# Patient Record
Sex: FEMALE | URBAN - NONMETROPOLITAN AREA
[De-identification: names, ages, dates, MRNs, and addresses within clinical notes are randomized per-mention and may not be internally consistent; named-entity substitution may affect disease eponyms.]

---

## 2017-02-24 ENCOUNTER — TELEPHONE (OUTPATIENT)
Dept: NEUROLOGY | Age: 62
End: 2017-02-24

## 2021-05-21 RX ORDER — MIRTAZAPINE 30 MG/1
30 TABLET, FILM COATED ORAL NIGHTLY
COMMUNITY

## 2021-05-21 RX ORDER — LEVOTHYROXINE SODIUM 0.05 MG/1
50 TABLET ORAL DAILY
COMMUNITY

## 2021-05-21 RX ORDER — OMEPRAZOLE 20 MG/1
20 CAPSULE, DELAYED RELEASE ORAL DAILY
COMMUNITY

## 2021-08-05 ENCOUNTER — TELEPHONE (OUTPATIENT)
Dept: CARDIOLOGY | Facility: CLINIC | Age: 66
End: 2021-08-05

## 2021-08-05 NOTE — TELEPHONE ENCOUNTER
Patient was asleep, so I informed her daughter that she really needs to keep her appointment in September. She stated her mother wasn't feeling well. I told her to try to keep the upcoming September appointment, that Dr Sierra had referred her to us several months ago for stent placement. She has rescheduled five appointments since May. Patient's daughter voiced understanding.Jesenia Childress MA

## 2024-01-19 ENCOUNTER — APPOINTMENT (OUTPATIENT)
Dept: CT IMAGING | Age: 69
End: 2024-01-19
Payer: MEDICARE

## 2024-01-19 ENCOUNTER — HOSPITAL ENCOUNTER (INPATIENT)
Age: 69
LOS: 7 days | Discharge: HOSPICE | End: 2024-01-26
Attending: EMERGENCY MEDICINE | Admitting: STUDENT IN AN ORGANIZED HEALTH CARE EDUCATION/TRAINING PROGRAM
Payer: MEDICARE

## 2024-01-19 ENCOUNTER — APPOINTMENT (OUTPATIENT)
Dept: GENERAL RADIOLOGY | Age: 69
End: 2024-01-19
Payer: MEDICARE

## 2024-01-19 DIAGNOSIS — Z95.1 HX OF CABG: ICD-10-CM

## 2024-01-19 DIAGNOSIS — J96.01 ACUTE RESPIRATORY FAILURE WITH HYPOXIA (HCC): ICD-10-CM

## 2024-01-19 DIAGNOSIS — I65.23 CAROTID OCCLUSION, BILATERAL: ICD-10-CM

## 2024-01-19 DIAGNOSIS — I63.511 ACUTE ISCHEMIC RIGHT MCA STROKE (HCC): Primary | ICD-10-CM

## 2024-01-19 PROBLEM — R79.89 ELEVATED TROPONIN: Status: ACTIVE | Noted: 2024-01-19

## 2024-01-19 PROBLEM — M62.82 NON-TRAUMATIC RHABDOMYOLYSIS: Status: ACTIVE | Noted: 2024-01-19

## 2024-01-19 PROBLEM — I63.9 CEREBROVASCULAR ACCIDENT (CVA) DETERMINED BY CLINICAL ASSESSMENT (HCC): Status: ACTIVE | Noted: 2024-01-19

## 2024-01-19 LAB
ALBUMIN SERPL-MCNC: 4.3 G/DL (ref 3.5–5.2)
ALLENS TEST: ABNORMAL
ALP SERPL-CCNC: 165 U/L (ref 35–104)
ALT SERPL-CCNC: 11 U/L (ref 5–33)
AMPHET UR QL SCN: NEGATIVE
ANION GAP SERPL CALCULATED.3IONS-SCNC: 16 MMOL/L (ref 7–19)
APTT PPP: 27.3 SEC (ref 26–36.2)
AST SERPL-CCNC: 44 U/L (ref 5–32)
BACTERIA URNS QL MICRO: NEGATIVE /HPF
BARBITURATES UR QL SCN: NEGATIVE
BASE EXCESS ARTERIAL: -0.2 MMOL/L (ref -2–2)
BASOPHILS # BLD: 0 K/UL (ref 0–0.2)
BASOPHILS NFR BLD: 0.1 % (ref 0–1)
BENZODIAZ UR QL SCN: NEGATIVE
BILIRUB SERPL-MCNC: 0.7 MG/DL (ref 0.2–1.2)
BILIRUB UR QL STRIP: NEGATIVE
BUN SERPL-MCNC: 20 MG/DL (ref 8–23)
BUPRENORPHINE URINE: NEGATIVE
CALCIUM SERPL-MCNC: 10.3 MG/DL (ref 8.8–10.2)
CANNABINOIDS UR QL SCN: NEGATIVE
CARBOXYHEMOGLOBIN ARTERIAL: 1.2 % (ref 0–5)
CHLORIDE SERPL-SCNC: 100 MMOL/L (ref 98–111)
CK SERPL-CCNC: 1131 U/L (ref 26–192)
CLARITY UR: CLEAR
CO2 SERPL-SCNC: 23 MMOL/L (ref 22–29)
COCAINE UR QL SCN: NEGATIVE
COLOR UR: ABNORMAL
CREAT SERPL-MCNC: 0.7 MG/DL (ref 0.5–0.9)
CRYSTALS URNS MICRO: ABNORMAL /HPF
DRUG SCREEN COMMENT UR-IMP: ABNORMAL
EOSINOPHIL # BLD: 0 K/UL (ref 0–0.6)
EOSINOPHIL NFR BLD: 0 % (ref 0–5)
EPI CELLS #/AREA URNS AUTO: 2 /HPF (ref 0–5)
ERYTHROCYTE [DISTWIDTH] IN BLOOD BY AUTOMATED COUNT: 15.5 % (ref 11.5–14.5)
ETHANOLAMINE SERPL-MCNC: <10 MG/DL (ref 0–0.08)
FENTANYL SCREEN, URINE: NEGATIVE
FIO2: 21 %
GLUCOSE BLD-MCNC: 132 MG/DL (ref 70–99)
GLUCOSE BLD-MCNC: 175 MG/DL (ref 70–99)
GLUCOSE BLD-MCNC: 194 MG/DL (ref 70–99)
GLUCOSE SERPL-MCNC: 235 MG/DL (ref 74–109)
GLUCOSE UR STRIP.AUTO-MCNC: NEGATIVE MG/DL
HCO3 ARTERIAL: 24 MMOL/L (ref 22–26)
HCT VFR BLD AUTO: 45.9 % (ref 37–47)
HEMOGLOBIN, ART, EXTENDED: 13.5 G/DL (ref 12–16)
HGB BLD-MCNC: 14.5 G/DL (ref 12–16)
HGB UR STRIP.AUTO-MCNC: NEGATIVE MG/L
HYALINE CASTS #/AREA URNS AUTO: 18 /HPF (ref 0–8)
IMM GRANULOCYTES # BLD: 0.1 K/UL
INR PPP: 1.2 (ref 0.88–1.18)
KETONES UR STRIP.AUTO-MCNC: NEGATIVE MG/DL
LEUKOCYTE ESTERASE UR QL STRIP.AUTO: NEGATIVE
LYMPHOCYTES # BLD: 0.4 K/UL (ref 1.1–4.5)
LYMPHOCYTES NFR BLD: 2.4 % (ref 20–40)
MCH RBC QN AUTO: 27.9 PG (ref 27–31)
MCHC RBC AUTO-ENTMCNC: 31.6 G/DL (ref 33–37)
MCV RBC AUTO: 88.3 FL (ref 81–99)
METHADONE UR QL SCN: NEGATIVE
METHAMPHETAMINE, URINE: NEGATIVE
METHEMOGLOBIN ARTERIAL: 0 %
MODE: ABNORMAL
MONOCYTES # BLD: 0.6 K/UL (ref 0–0.9)
MONOCYTES NFR BLD: 4.2 % (ref 0–10)
NEUTROPHILS # BLD: 13.8 K/UL (ref 1.5–7.5)
NEUTS SEG NFR BLD: 92.7 % (ref 50–65)
NITRITE UR QL STRIP.AUTO: NEGATIVE
O2 CONTENT ARTERIAL: 14.6 ML/DL
O2 SAT, ARTERIAL: 77.2 %
O2 THERAPY: ABNORMAL
OPIATES UR QL SCN: POSITIVE
OXYCODONE UR QL SCN: NEGATIVE
PCO2 ARTERIAL: 37 MMHG (ref 35–45)
PCP UR QL SCN: NEGATIVE
PERFORMED ON: ABNORMAL
PH ARTERIAL: 7.42 (ref 7.35–7.45)
PH UR STRIP.AUTO: 5.5 [PH] (ref 5–8)
PLATELET # BLD AUTO: 295 K/UL (ref 130–400)
PMV BLD AUTO: 9.9 FL (ref 9.4–12.3)
PO2 ARTERIAL: 41 MMHG (ref 80–100)
POTASSIUM BLD-SCNC: 3.3 MMOL/L
POTASSIUM SERPL-SCNC: 4.3 MMOL/L (ref 3.5–5)
PROT SERPL-MCNC: 7.6 G/DL (ref 6.6–8.7)
PROT UR STRIP.AUTO-MCNC: 100 MG/DL
PROTHROMBIN TIME: 14.8 SEC (ref 12–14.6)
RBC # BLD AUTO: 5.2 M/UL (ref 4.2–5.4)
RBC #/AREA URNS AUTO: 1 /HPF (ref 0–4)
SAMPLE SOURCE: ABNORMAL
SODIUM SERPL-SCNC: 139 MMOL/L (ref 136–145)
SP GR UR STRIP.AUTO: 1.04 (ref 1–1.03)
SPONT RATE(BPM): 19
T4 FREE SERPL-MCNC: 0.64 NG/DL (ref 0.93–1.7)
TRICYCLIC, URINE: NEGATIVE
TROPONIN, HIGH SENSITIVITY: 223 NG/L (ref 0–14)
TROPONIN, HIGH SENSITIVITY: 244 NG/L (ref 0–14)
TSH SERPL DL<=0.005 MIU/L-ACNC: 29.31 UIU/ML (ref 0.35–5.5)
UROBILINOGEN UR STRIP.AUTO-MCNC: 0.2 E.U./DL
WBC # BLD AUTO: 14.9 K/UL (ref 4.8–10.8)
WBC #/AREA URNS AUTO: 2 /HPF (ref 0–5)

## 2024-01-19 PROCEDURE — 85730 THROMBOPLASTIN TIME PARTIAL: CPT

## 2024-01-19 PROCEDURE — 93970 EXTREMITY STUDY: CPT | Performed by: SURGERY

## 2024-01-19 PROCEDURE — 51702 INSERT TEMP BLADDER CATH: CPT

## 2024-01-19 PROCEDURE — 70498 CT ANGIOGRAPHY NECK: CPT

## 2024-01-19 PROCEDURE — 84439 ASSAY OF FREE THYROXINE: CPT

## 2024-01-19 PROCEDURE — 84484 ASSAY OF TROPONIN QUANT: CPT

## 2024-01-19 PROCEDURE — 1210000000 HC MED SURG R&B

## 2024-01-19 PROCEDURE — 84443 ASSAY THYROID STIM HORMONE: CPT

## 2024-01-19 PROCEDURE — 6360000004 HC RX CONTRAST MEDICATION: Performed by: EMERGENCY MEDICINE

## 2024-01-19 PROCEDURE — 70450 CT HEAD/BRAIN W/O DYE: CPT

## 2024-01-19 PROCEDURE — 85610 PROTHROMBIN TIME: CPT

## 2024-01-19 PROCEDURE — 99285 EMERGENCY DEPT VISIT HI MDM: CPT

## 2024-01-19 PROCEDURE — 2580000003 HC RX 258: Performed by: NURSE PRACTITIONER

## 2024-01-19 PROCEDURE — 36600 WITHDRAWAL OF ARTERIAL BLOOD: CPT

## 2024-01-19 PROCEDURE — 94760 N-INVAS EAR/PLS OXIMETRY 1: CPT

## 2024-01-19 PROCEDURE — 36415 COLL VENOUS BLD VENIPUNCTURE: CPT

## 2024-01-19 PROCEDURE — G0480 DRUG TEST DEF 1-7 CLASSES: HCPCS

## 2024-01-19 PROCEDURE — 82962 GLUCOSE BLOOD TEST: CPT

## 2024-01-19 PROCEDURE — 82550 ASSAY OF CK (CPK): CPT

## 2024-01-19 PROCEDURE — 82077 ASSAY SPEC XCP UR&BREATH IA: CPT

## 2024-01-19 PROCEDURE — 2580000003 HC RX 258: Performed by: EMERGENCY MEDICINE

## 2024-01-19 PROCEDURE — 2700000000 HC OXYGEN THERAPY PER DAY

## 2024-01-19 PROCEDURE — 80053 COMPREHEN METABOLIC PANEL: CPT

## 2024-01-19 PROCEDURE — 82803 BLOOD GASES ANY COMBINATION: CPT

## 2024-01-19 PROCEDURE — 80307 DRUG TEST PRSMV CHEM ANLYZR: CPT

## 2024-01-19 PROCEDURE — 71045 X-RAY EXAM CHEST 1 VIEW: CPT

## 2024-01-19 PROCEDURE — 85025 COMPLETE CBC W/AUTO DIFF WBC: CPT

## 2024-01-19 PROCEDURE — 81001 URINALYSIS AUTO W/SCOPE: CPT

## 2024-01-19 RX ORDER — ASPIRIN 300 MG/1
300 SUPPOSITORY RECTAL DAILY
Status: DISCONTINUED | OUTPATIENT
Start: 2024-01-20 | End: 2024-01-23

## 2024-01-19 RX ORDER — SODIUM CHLORIDE 9 MG/ML
INJECTION, SOLUTION INTRAVENOUS CONTINUOUS
Status: DISCONTINUED | OUTPATIENT
Start: 2024-01-19 | End: 2024-01-20

## 2024-01-19 RX ORDER — ONDANSETRON 4 MG/1
4 TABLET, ORALLY DISINTEGRATING ORAL EVERY 8 HOURS PRN
Status: DISCONTINUED | OUTPATIENT
Start: 2024-01-19 | End: 2024-01-26 | Stop reason: HOSPADM

## 2024-01-19 RX ORDER — ATORVASTATIN CALCIUM 80 MG/1
80 TABLET, FILM COATED ORAL NIGHTLY
Status: DISCONTINUED | OUTPATIENT
Start: 2024-01-19 | End: 2024-01-23

## 2024-01-19 RX ORDER — ONDANSETRON 2 MG/ML
4 INJECTION INTRAMUSCULAR; INTRAVENOUS EVERY 6 HOURS PRN
Status: DISCONTINUED | OUTPATIENT
Start: 2024-01-19 | End: 2024-01-26 | Stop reason: HOSPADM

## 2024-01-19 RX ORDER — INSULIN LISPRO 100 [IU]/ML
0-4 INJECTION, SOLUTION INTRAVENOUS; SUBCUTANEOUS NIGHTLY
Status: DISCONTINUED | OUTPATIENT
Start: 2024-01-19 | End: 2024-01-19

## 2024-01-19 RX ORDER — INSULIN LISPRO 100 [IU]/ML
0-4 INJECTION, SOLUTION INTRAVENOUS; SUBCUTANEOUS
Status: DISCONTINUED | OUTPATIENT
Start: 2024-01-19 | End: 2024-01-19

## 2024-01-19 RX ORDER — 0.9 % SODIUM CHLORIDE 0.9 %
500 INTRAVENOUS SOLUTION INTRAVENOUS ONCE
Status: COMPLETED | OUTPATIENT
Start: 2024-01-19 | End: 2024-01-19

## 2024-01-19 RX ORDER — ENOXAPARIN SODIUM 100 MG/ML
30 INJECTION SUBCUTANEOUS DAILY
Status: DISCONTINUED | OUTPATIENT
Start: 2024-01-20 | End: 2024-01-20

## 2024-01-19 RX ORDER — SODIUM CHLORIDE 0.9 % (FLUSH) 0.9 %
5-40 SYRINGE (ML) INJECTION EVERY 12 HOURS SCHEDULED
Status: DISCONTINUED | OUTPATIENT
Start: 2024-01-19 | End: 2024-01-26 | Stop reason: HOSPADM

## 2024-01-19 RX ORDER — ASPIRIN 81 MG/1
81 TABLET, CHEWABLE ORAL DAILY
Status: DISCONTINUED | OUTPATIENT
Start: 2024-01-20 | End: 2024-01-23

## 2024-01-19 RX ORDER — SODIUM CHLORIDE 9 MG/ML
INJECTION, SOLUTION INTRAVENOUS PRN
Status: DISCONTINUED | OUTPATIENT
Start: 2024-01-19 | End: 2024-01-26 | Stop reason: HOSPADM

## 2024-01-19 RX ORDER — DEXTROSE MONOHYDRATE 100 MG/ML
INJECTION, SOLUTION INTRAVENOUS CONTINUOUS PRN
Status: DISCONTINUED | OUTPATIENT
Start: 2024-01-19 | End: 2024-01-26 | Stop reason: HOSPADM

## 2024-01-19 RX ORDER — SODIUM CHLORIDE 0.9 % (FLUSH) 0.9 %
5-40 SYRINGE (ML) INJECTION PRN
Status: DISCONTINUED | OUTPATIENT
Start: 2024-01-19 | End: 2024-01-26 | Stop reason: HOSPADM

## 2024-01-19 RX ORDER — POLYETHYLENE GLYCOL 3350 17 G/17G
17 POWDER, FOR SOLUTION ORAL DAILY PRN
Status: DISCONTINUED | OUTPATIENT
Start: 2024-01-19 | End: 2024-01-26 | Stop reason: HOSPADM

## 2024-01-19 RX ADMIN — SODIUM CHLORIDE: 9 INJECTION, SOLUTION INTRAVENOUS at 21:14

## 2024-01-19 RX ADMIN — SODIUM CHLORIDE 500 ML: 9 INJECTION, SOLUTION INTRAVENOUS at 18:41

## 2024-01-19 RX ADMIN — IOPAMIDOL 75 ML: 755 INJECTION, SOLUTION INTRAVENOUS at 17:34

## 2024-01-19 NOTE — ED PROVIDER NOTES
[facility] EMERGENCY DEPT  eMERGENCY dEPARTMENT eNCOUnter      Pt Name: Ivette Chu  MRN: [id number]  Birthdate 1955  Date of evaluation: 1/19/2024  Provider: Moshe Mendoza MD    CHIEF COMPLAINT       Chief Complaint   Patient presents with    Cerebrovascular Accident     Patient's LKW 2300 last night; Patient became very weak, unable to move L side of body. Eye deviated to R side          HISTORY OF PRESENT ILLNESS   (Location/Symptom, Timing/Onset,Context/Setting, Quality, Duration, Modifying Factors, Severity)  Note limiting factors.   Ivette Chu is a 68 y.o. female who presents to the emergency department   With stroke alert and strokelike symptoms.  The patient is brought to the ER by [facility]edics.  The patient per report was last known well at 2300 hrs. yesterday.  The patient arrives with left-sided neglect, right eye deviation and nonverbal along with flaccid left extremity upper and lower.  The patient has no known deficits from any prior strokes.  She does have a CABG scar.  The patient otherwise was sleeping at home on the floor.  The patient on arrival is nonverbal.     The history is provided by the EMS personnel. The history is limited by the condition of the patient.       NursingNotes were reviewed.    REVIEW OF SYSTEMS    (2-9 systems for level 4, 10 or more for level 5)     Review of Systems   Unable to perform ROS: Mental status change       A complete review of systems was performed and is negative except as noted above in the HPI.       PAST MEDICAL HISTORY     Past Medical History:   Diagnosis Date    Chronic pain     Expressive aphasia     Hip pain     Low back pain     Muscle spasm     Radiculopathy, lumbar region     Shoulder pain     Stroke (cerebrum) (HCC)          SURGICAL HISTORY     History reviewed. No pertinent surgical history.      CURRENT MEDICATIONS       Previous Medications    No medications on file       ALLERGIES     Patient has no known

## 2024-01-20 ENCOUNTER — APPOINTMENT (OUTPATIENT)
Dept: CT IMAGING | Age: 69
End: 2024-01-20
Payer: MEDICARE

## 2024-01-20 PROBLEM — J96.01 ACUTE HYPOXEMIC RESPIRATORY FAILURE (HCC): Status: ACTIVE | Noted: 2024-01-20

## 2024-01-20 PROBLEM — J40 BRONCHITIS: Status: ACTIVE | Noted: 2024-01-20

## 2024-01-20 PROBLEM — E43 SEVERE MALNUTRITION (HCC): Chronic | Status: ACTIVE | Noted: 2024-01-20

## 2024-01-20 PROBLEM — E03.9 SEVERE HYPOTHYROIDISM: Status: ACTIVE | Noted: 2024-01-20

## 2024-01-20 PROBLEM — I71.43 INFRARENAL ABDOMINAL AORTIC ANEURYSM (AAA) WITHOUT RUPTURE (HCC): Status: ACTIVE | Noted: 2024-01-20

## 2024-01-20 PROBLEM — J69.0 ASPIRATION PNEUMONIA OF RIGHT LOWER LOBE (HCC): Status: ACTIVE | Noted: 2024-01-20

## 2024-01-20 PROBLEM — I26.99 ACUTE PULMONARY EMBOLISM WITHOUT ACUTE COR PULMONALE (HCC): Status: ACTIVE | Noted: 2024-01-20

## 2024-01-20 LAB
ANION GAP SERPL CALCULATED.3IONS-SCNC: 13 MMOL/L (ref 7–19)
BUN SERPL-MCNC: 17 MG/DL (ref 8–23)
CALCIUM SERPL-MCNC: 8.9 MG/DL (ref 8.8–10.2)
CHLORIDE SERPL-SCNC: 109 MMOL/L (ref 98–111)
CHOLEST SERPL-MCNC: 164 MG/DL (ref 160–199)
CK SERPL-CCNC: 641 U/L (ref 26–192)
CO2 SERPL-SCNC: 23 MMOL/L (ref 22–29)
CREAT SERPL-MCNC: 0.6 MG/DL (ref 0.5–0.9)
ERYTHROCYTE [DISTWIDTH] IN BLOOD BY AUTOMATED COUNT: 15 % (ref 11.5–14.5)
GLUCOSE BLD-MCNC: 108 MG/DL (ref 70–99)
GLUCOSE BLD-MCNC: 109 MG/DL (ref 70–99)
GLUCOSE BLD-MCNC: 112 MG/DL (ref 70–99)
GLUCOSE BLD-MCNC: 178 MG/DL (ref 70–99)
GLUCOSE BLD-MCNC: 93 MG/DL (ref 70–99)
GLUCOSE SERPL-MCNC: 124 MG/DL (ref 74–109)
HBA1C MFR BLD: 5.4 % (ref 4–6)
HCT VFR BLD AUTO: 38.5 % (ref 37–47)
HDLC SERPL-MCNC: 62 MG/DL (ref 65–121)
HGB BLD-MCNC: 12.7 G/DL (ref 12–16)
LDLC SERPL CALC-MCNC: 83 MG/DL
MAGNESIUM SERPL-MCNC: 1.8 MG/DL (ref 1.6–2.4)
MCH RBC QN AUTO: 27.7 PG (ref 27–31)
MCHC RBC AUTO-ENTMCNC: 33 G/DL (ref 33–37)
MCV RBC AUTO: 83.9 FL (ref 81–99)
PERFORMED ON: ABNORMAL
PERFORMED ON: NORMAL
PLATELET # BLD AUTO: 251 K/UL (ref 130–400)
PMV BLD AUTO: 10.2 FL (ref 9.4–12.3)
POTASSIUM SERPL-SCNC: 3.5 MMOL/L (ref 3.5–5)
RBC # BLD AUTO: 4.59 M/UL (ref 4.2–5.4)
SODIUM SERPL-SCNC: 145 MMOL/L (ref 136–145)
TRIGL SERPL-MCNC: 96 MG/DL (ref 0–149)
WBC # BLD AUTO: 15.8 K/UL (ref 4.8–10.8)

## 2024-01-20 PROCEDURE — 92523 SPEECH SOUND LANG COMPREHEN: CPT

## 2024-01-20 PROCEDURE — 2500000003 HC RX 250 WO HCPCS: Performed by: STUDENT IN AN ORGANIZED HEALTH CARE EDUCATION/TRAINING PROGRAM

## 2024-01-20 PROCEDURE — 36415 COLL VENOUS BLD VENIPUNCTURE: CPT

## 2024-01-20 PROCEDURE — 51702 INSERT TEMP BLADDER CATH: CPT

## 2024-01-20 PROCEDURE — 92522 EVALUATE SPEECH PRODUCTION: CPT

## 2024-01-20 PROCEDURE — 6360000002 HC RX W HCPCS: Performed by: NURSE PRACTITIONER

## 2024-01-20 PROCEDURE — 71275 CT ANGIOGRAPHY CHEST: CPT

## 2024-01-20 PROCEDURE — 2580000003 HC RX 258: Performed by: NURSE PRACTITIONER

## 2024-01-20 PROCEDURE — 83735 ASSAY OF MAGNESIUM: CPT

## 2024-01-20 PROCEDURE — 82550 ASSAY OF CK (CPK): CPT

## 2024-01-20 PROCEDURE — 6360000002 HC RX W HCPCS: Performed by: STUDENT IN AN ORGANIZED HEALTH CARE EDUCATION/TRAINING PROGRAM

## 2024-01-20 PROCEDURE — 80048 BASIC METABOLIC PNL TOTAL CA: CPT

## 2024-01-20 PROCEDURE — 99223 1ST HOSP IP/OBS HIGH 75: CPT | Performed by: PSYCHIATRY & NEUROLOGY

## 2024-01-20 PROCEDURE — 2580000003 HC RX 258: Performed by: STUDENT IN AN ORGANIZED HEALTH CARE EDUCATION/TRAINING PROGRAM

## 2024-01-20 PROCEDURE — 92610 EVALUATE SWALLOWING FUNCTION: CPT

## 2024-01-20 PROCEDURE — 94761 N-INVAS EAR/PLS OXIMETRY MLT: CPT

## 2024-01-20 PROCEDURE — 6370000000 HC RX 637 (ALT 250 FOR IP): Performed by: NURSE PRACTITIONER

## 2024-01-20 PROCEDURE — 6360000004 HC RX CONTRAST MEDICATION

## 2024-01-20 PROCEDURE — 82962 GLUCOSE BLOOD TEST: CPT

## 2024-01-20 PROCEDURE — 94640 AIRWAY INHALATION TREATMENT: CPT

## 2024-01-20 PROCEDURE — C8929 TTE W OR WO FOL WCON,DOPPLER: HCPCS

## 2024-01-20 PROCEDURE — 94760 N-INVAS EAR/PLS OXIMETRY 1: CPT

## 2024-01-20 PROCEDURE — 85027 COMPLETE CBC AUTOMATED: CPT

## 2024-01-20 PROCEDURE — 80061 LIPID PANEL: CPT

## 2024-01-20 PROCEDURE — 99223 1ST HOSP IP/OBS HIGH 75: CPT | Performed by: SURGERY

## 2024-01-20 PROCEDURE — 6370000000 HC RX 637 (ALT 250 FOR IP): Performed by: STUDENT IN AN ORGANIZED HEALTH CARE EDUCATION/TRAINING PROGRAM

## 2024-01-20 PROCEDURE — 83036 HEMOGLOBIN GLYCOSYLATED A1C: CPT

## 2024-01-20 PROCEDURE — 2700000000 HC OXYGEN THERAPY PER DAY

## 2024-01-20 PROCEDURE — 1210000000 HC MED SURG R&B

## 2024-01-20 RX ORDER — ENOXAPARIN SODIUM 100 MG/ML
30 INJECTION SUBCUTANEOUS DAILY
Status: DISCONTINUED | OUTPATIENT
Start: 2024-01-21 | End: 2024-01-25

## 2024-01-20 RX ORDER — LEVOTHYROXINE SODIUM 20 UG/ML
50 INJECTION, SOLUTION INTRAVENOUS DAILY
Status: DISCONTINUED | OUTPATIENT
Start: 2024-01-21 | End: 2024-01-23

## 2024-01-20 RX ORDER — LEVOTHYROXINE SODIUM 20 UG/ML
200 INJECTION, SOLUTION INTRAVENOUS ONCE
Status: COMPLETED | OUTPATIENT
Start: 2024-01-20 | End: 2024-01-20

## 2024-01-20 RX ORDER — DEXTROSE MONOHYDRATE, SODIUM CHLORIDE, AND POTASSIUM CHLORIDE 50; 1.49; 4.5 G/1000ML; G/1000ML; G/1000ML
INJECTION, SOLUTION INTRAVENOUS CONTINUOUS
Status: DISCONTINUED | OUTPATIENT
Start: 2024-01-20 | End: 2024-01-25

## 2024-01-20 RX ORDER — IPRATROPIUM BROMIDE AND ALBUTEROL SULFATE 2.5; .5 MG/3ML; MG/3ML
1 SOLUTION RESPIRATORY (INHALATION)
Status: DISCONTINUED | OUTPATIENT
Start: 2024-01-20 | End: 2024-01-26 | Stop reason: HOSPADM

## 2024-01-20 RX ORDER — ENOXAPARIN SODIUM 100 MG/ML
1 INJECTION SUBCUTANEOUS 2 TIMES DAILY
Status: DISCONTINUED | OUTPATIENT
Start: 2024-01-20 | End: 2024-01-20

## 2024-01-20 RX ORDER — ENOXAPARIN SODIUM 100 MG/ML
40 INJECTION SUBCUTANEOUS DAILY
Status: DISCONTINUED | OUTPATIENT
Start: 2024-01-21 | End: 2024-01-20 | Stop reason: DRUGHIGH

## 2024-01-20 RX ORDER — ACETYLCYSTEINE 200 MG/ML
600 SOLUTION ORAL; RESPIRATORY (INHALATION)
Status: DISPENSED | OUTPATIENT
Start: 2024-01-20 | End: 2024-01-22

## 2024-01-20 RX ADMIN — ENOXAPARIN SODIUM 30 MG: 100 INJECTION SUBCUTANEOUS at 10:44

## 2024-01-20 RX ADMIN — IPRATROPIUM BROMIDE AND ALBUTEROL SULFATE 1 DOSE: 2.5; .5 SOLUTION RESPIRATORY (INHALATION) at 10:10

## 2024-01-20 RX ADMIN — PERFLUTREN 1.5 ML: 6.52 INJECTION, SUSPENSION INTRAVENOUS at 08:34

## 2024-01-20 RX ADMIN — IPRATROPIUM BROMIDE AND ALBUTEROL SULFATE 1 DOSE: 2.5; .5 SOLUTION RESPIRATORY (INHALATION) at 14:20

## 2024-01-20 RX ADMIN — SODIUM CHLORIDE, PRESERVATIVE FREE 10 ML: 5 INJECTION INTRAVENOUS at 10:40

## 2024-01-20 RX ADMIN — ACETYLCYSTEINE 600 MG: 200 SOLUTION ORAL; RESPIRATORY (INHALATION) at 18:31

## 2024-01-20 RX ADMIN — PIPERACILLIN SODIUM AND TAZOBACTAM SODIUM 4500 MG: 4; .5 INJECTION, POWDER, LYOPHILIZED, FOR SOLUTION INTRAVENOUS at 12:50

## 2024-01-20 RX ADMIN — IPRATROPIUM BROMIDE AND ALBUTEROL SULFATE 1 DOSE: 2.5; .5 SOLUTION RESPIRATORY (INHALATION) at 18:31

## 2024-01-20 RX ADMIN — DEXTROSE MONOHYDRATE, SODIUM CHLORIDE, AND POTASSIUM CHLORIDE: 50; 4.5; 1.49 INJECTION, SOLUTION INTRAVENOUS at 10:43

## 2024-01-20 RX ADMIN — PIPERACILLIN AND TAZOBACTAM 3375 MG: 3; .375 INJECTION, POWDER, LYOPHILIZED, FOR SOLUTION INTRAVENOUS at 18:41

## 2024-01-20 RX ADMIN — LEVOTHYROXINE SODIUM 200 MCG: 20 INJECTION, SOLUTION INTRAVENOUS at 10:44

## 2024-01-20 RX ADMIN — ASPIRIN 300 MG: 300 SUPPOSITORY RECTAL at 10:44

## 2024-01-20 NOTE — CONSULTS
Patient name: Ivette Chu  MRN: [id number]  YOB: 1955    Date of consultation: 1/20/2024    Reason for consultation: Left-sided PE    HPI: Ms. Chu is a very pleasant 68-year-old patient admitted with acute on chronic CVA, bilateral internal carotid artery occlusion, flaccid left-sided weakness, left-sided neglect, right eye deviation.  She lives with her daughter, and her daughter found her unresponsive and called EMS.  The patient was brought to the hospital and a discussion was held with [facility] for potential transfer, but they deemed the patient unsuitable for intervention and she was admitted here for further care post CVA.  She was having increased oxygen requirements and work of breathing, and a CT of the chest was performed.  Significantly, the patient had right lung changes consistent with aspiration likely secondary to her stroke, versus significant infectious etiology, for right middle lobe near complete atelectasis as well as right lower lobe infiltrates and opacification of the bronchioles.  This is most likely the source of the patient's respiratory distress.  She has an incidental finding of 2 small nonocclusive pulmonary emboli, one in the left upper lobe segmental and one in the left lower lobe segmental, but I think the contribution to her respiratory distress is minimal.  We do not have a venous duplex of the lower extremities to see if there is any remaining DVT present of the clinical significant size.  I have ordered this, but it is the weekend and I am not sure it will be done this weekend.  The nurse also mention to me that the patient had an abdominal aortic aneurysm that she was concerned about.  This is also an incidental finding.  The infrarenal aorta is mildly dilated measuring 3.1 cm x 2.5 cm in the infrarenal portion from what is visualized on the CTA of the chest.  This is not clinically significant for this patient.  It certainly is nothing that  exposure control, adjustment of the mA and/or kV according to size, and the use of iterative reconstruction technique.    Electronically signed by: SHELL TIRADO M.D.    I have reviewed the imaging for the CT of the head, CT of the neck, CT of the chest.  The patient's acute right CVA and chronic left CVA are substantial.  Bilateral carotid artery occlusion, likely chronic left and acute versus subacute right.  Small nonocclusive upper and lower segmental pulmonary emboli.  Likely only a minor contribution to the patient's respiratory distress, which is primarily due to what appears to be a large aspiration on the right, likely due to CVA.  I measure the infrarenal aorta at 3.1 cm x 2.5 cm, and is calcified.  This is an incidental finding, and not of any immediate clinical significance.  It can be followed with a repeat CT scan in 3 years depending on the patient's prognosis from her CVAs.    Assessment plan: This is a very pleasant 68-year-old patient with chronic left CVA, significant acute right CVA.  1.  Left-sided pulmonary emboli are small, non-occlusive, segmental.  Likely minimal clinical significance from PE (aspiration related to her CVA with RML and RLL changes + COPD and emphysema is likely main cause of respiratory insufficiency).  We have been consulted to discuss options for an IVC filter since the patient has PE and has a high risk for hemorrhagic conversion of her stroke if she is fully anticoagulated.  We would be happy to place an IVC filter, but first I would like to confirm that she has clinically significant DVT in her unilateral or bilateral lower extremities.  We have ordered a venous duplex.  I have discussed this with Dr. Zimmerman and he is agreeable.    2.  The patient has a mildly ectatic infrarenal aorta, measuring 3.1 x 2.5 cm, which is an incidental finding requiring no intervention at this time.  It can be followed up with a repeat CT in 3 years, depending on the patient's clinical

## 2024-01-20 NOTE — PROGRESS NOTES
STROKE ADMISSION    Date of Note: 1/20/2024  Time of Note: 2:11 AM    Patient Name: Ivette Chu  MRN:   [id number]  YOB: 1955  Age:       68 y.o.  Gender:      female    Room:   [address] [place] [address]   Allergies:  Patient has no known allergies.  Code Status: Full Code    Adm. Provider: Carlos Zimmerman MD  Neuro. Provider:       Bedside report and handoff assessment completed with BERNARDINO Espino RN.      ED Chief Complaint    Chief Complaint   Patient presents with    Cerebrovascular Accident     Patient's LKW 2300 last night; Patient became very weak, unable to move L side of body. Eye deviated to R side          ED Impression    1. Acute ischemic right MCA stroke (HCC)    2. Carotid occlusion, bilateral    3. Hx of CABG    4. Acute respiratory failure with hypoxia (HCC)                           Last Known Well Date & Time      Last Known to be Well (Stroke Diagnosis)  Date Last Known Well: 01/18/24  Time Last Known Well: 2300      Triage Vital Signs    ED Triage Vitals   BP Temp Temp Source Pulse Respirations SpO2 Height Weight - Scale   01/19/24 1712 01/19/24 1712 01/19/24 1712 01/19/24 1712 01/19/24 1712 01/19/24 1713 01/19/24 1712 01/19/24 1712   (!) 117/91 98.2 °F (36.8 °C) Axillary (!) 101 23 95 % 1.549 m (5' 1\") 40 kg (88 lb 3.2 oz)         Last Vital Signs    Vital Signs  Temp: 97.7 °F (36.5 °C)  Temp Source: Temporal  Pulse: 96  Heart Rate Source: Monitor  Respirations: 16  BP: (!) 145/53  MAP (Calculated): 84  MAP (mmHg): 79    Height and Weight  Height: 154.9 cm (5' 1\")  Weight - Scale: 40 kg (88 lb 3.2 oz)  Weight Method: Actual  BSA (Calculated - sq m): 1.31 sq meters  BMI (Calculated): 16.7      Current NIHSS:    NIH Stroke Scale  Interval: Baseline  Level of Consciousness (1a): Responds only with reflex motor or autonomic effects  LOC Questions (1b): Answers neither question correctly  LOC Commands (1c): Performs neither task correctly  Best Gaze (2): (!) Forced deviation  Visual (3): (!) Complete  hemianopia  Facial Palsy (4): Normal symmetrical movement  Motor Arm, Left (5a): No movement  Motor Arm, Right (5b): No movement (some involuntary flexion)  Motor Leg, Left (6a): No effort against gravity, limb falls  Motor Leg, Right (6b): No effort against gravity, limb falls  Limb Ataxia (7): Absent  Sensory (8): (!) Severe to total loss  Best Language (9): Mute  Dysarthria (10): Severe, unintelligible slurring or mute  Extinction and Inattention (11): (!) Profound josé miguel-inattention or extinction to more than one modality  Total: 34      Current GCS:    [place] Coma Scale  Eye Opening: None  Best Verbal Response: Incomprehensible speech  Best Motor Response: Withdraws from pain  [place] Coma Scale Score: 7      [place] Swallow Screen (YSS)    Swallow Screening  Is the patient unable to remain alert for testing?: (!) Yes     [x] Completed in ED using the YSS Protocol, and documented prior to any PO meds, food, or drink:    [] Pass    [x] Fail.  Patient will need SLP evaluation prior to anything PO.        [] Not completed in ED    [x]  Patient strict NPO status for procedure/testing    Nurse eSignature:    Mak Allen RN  Date:   1/20/2024   Time:   2:11 AM

## 2024-01-20 NOTE — PROGRESS NOTES
Facility/Department: [address] SERVICES   CLINICAL BEDSIDE SWALLOW EVALUATION  SPEECH LANGUAGE EVALUATION     NAME: Ivette Chu  : 1955  MRN: [id number]    ADMISSION DATE: 2024  ADMITTING DIAGNOSIS: has Stroke (cerebrum) ([facility]); Expressive aphasia; Cerebrovascular accident (CVA) determined by clinical assessment ([facility]); Elevated troponin; Non-traumatic rhabdomyolysis; Bilateral carotid artery occlusion; Severe malnutrition ([facility]); Acute hypoxemic respiratory failure ([facility]); Severe hypothyroidism; Bronchitis; Acute pulmonary embolism without acute cor pulmonale ([facility]); and Aspiration pneumonia of right lower lobe ([facility]) on their problem list.    Date of Eval: 2024  Evaluating Therapist: MOHINDER Jones    Current Diet level:  NPO    Pain:  Pain Assessment: Adult Nonverbal Pain Scale (NPVS)    Reason for Referral  Ivette Chu was referred for a bedside swallow evaluation to assess the efficiency of her swallow function, identify signs and symptoms of aspiration and make recommendations regarding safe dietary consistencies, effective compensatory strategies, and safe eating environment.    Impression  Assessed patient's swallowing function. Patient exhibited sluggish, moderately decreased laryngeal elevation for swallow airway protection. No outward S/S penetration/aspiration was noted with any ice chip trial, puree consistency trial, or honey thick liquid trial presented during evaluation this date. Did not observe swallow function with any additional consistency as patient appeared lethargic within short period of time.     At this time, would continue NPO status. Essential meds crushed in pudding/applesauce. If patient receives mouth care prior to intake, okay for ice chips and swabs water for comfort. Will continue to follow. Thank you for this consult.     Treatment Plan  Requires SLP Intervention: Yes     Recommended Diet and Intervention  Diet Solids Recommendation: NPO  Liquid Consistency

## 2024-01-20 NOTE — PROGRESS NOTES
Physical Therapy  Name: Ivette Chu  MRN:  [id number]  Date of service:  1/20/2024    Spoke with RN, Esperanza, twice today regarding PT eval. She recommended holding PT today due to poor pt medical status and poor command follow. Will f/u at a later time.    Electronically signed by Lindsay Scott PT on 1/20/2024 at 3:59 PM

## 2024-01-20 NOTE — ED NOTES
ABGs ordered and resulted patient's Spo2 at 77.2% on RA per RT. Patient placed on Venturi mask at 15L. Patient now 92% on 15L.

## 2024-01-20 NOTE — PROGRESS NOTES
Comprehensive Nutrition Assessment    Type and Reason for Visit:  Initial, Positive Nutrition Screen    Nutrition Recommendations/Plan:   Severe Malnutrition to Problem List     Malnutrition Assessment:  Malnutrition Status:  Severe malnutrition (01/20/24 0755)    Context:  Chronic Illness     Findings of the 6 clinical characteristics of malnutrition:  Energy Intake:  Unable to assess  Weight Loss:  Unable to assess     Body Fat Loss:  Severe body fat loss Orbital, Triceps   Muscle Mass Loss:  Severe muscle mass loss Clavicles (pectoralis & deltoids)  Fluid Accumulation:  No significant fluid accumulation     Strength:  Not Performed    Nutrition Assessment:    Pt is Severely Malnourished AEB severe fat and muscle loss. Pt does not respond to my voice. Guest is asleep in room. Pt is currently NPO. SLP consult is pending. Will monitor for diet progression and implement nutrition intervention as needed. TF may need to be considered to meet nutritional needs if pt is unable to progress to an oral diet.    Current Nutrition Intake & Therapies:    Average Meal Intake: NPO  Diet NPO    Anthropometric Measures:  Height: 154.9 cm (5' 1\")  Ideal Body Weight (IBW): 105 lbs (48 kg)    Current Body Weight: 40 kg (88 lb 2.9 oz)  Current BMI (kg/m2): 16.7  BMI Categories: Underweight (BMI less than 22) age over 65    Estimated Daily Nutrient Needs:  Energy Requirements Based On: Kcal/kg  Weight Used for Energy Requirements: Current  Energy (kcal/day): 2217-4979 kcals/day  Weight Used for Protein Requirements: Current  Protein (g/day): 60-80 g/protein/day  Method Used for Fluid Requirements: 1 ml/kcal  Fluid (ml/day): 7681-3316 mL/day    Nutrition Diagnosis:   Severe malnutrition, In context of chronic illness related to inadequate protein-energy intake as evidenced by Criteria as identified in malnutrition assessment    Nutrition Interventions:   Food and/or Nutrient Delivery: Continue NPO, IV Fluid Delivery  Coordination of  Yes

## 2024-01-20 NOTE — PROGRESS NOTES
Daily Progress Note    Date:2024  Patient: Ivette Chu  : 1955  MRN:[id number]  CODE:DNR No additional code details  PCP:Vin Espino RN    Admit Date: 2024  5:08 PM   LOS: 1 day       Subjective:   Overnight on high flow supplemental O2 via Ventimask.  Patient somnolent but arousable, minimal speech          Summary: 68-year-old female with COPD, PAD, history of stroke with expressive aphasia, presented via EMS with family minimally responsive, last known normal evening prior to presentation.  Noted left-sided neglect and right eye deviation, noted evidence of stroke with infarcts in right frontal and parietal lobes, left encephalomalacia with prior MCA infarct; CTA head/neck showed occluded right common and bilateral ICA occlusions and concern for acute occlusion of a portion of right MCA.  ED discussed with [place], however not deemed candidate for a neurointerventional procedure.  Neurology consulted.  Patient also with acute hypoxemia confirmed on ABG, required high flow O2 via Ventimask along with tachycardia.  CTA chest obtained showed evidence of multiple pulmonary emboli.  Consider therapeutic anticoagulation, however noted high risk of hemorrhagic transformation of her large stroke per neurology.  Vascular surgery consulted to evaluate for possibility of IVC filter placement in lieu of anticoagulation.        Objective:      Vital signs in last 24 hours:  Patient Vitals for the past 24 hrs:   BP Temp Temp src Pulse Resp SpO2 Height Weight   24 1009 (!) 117/56 97.3 °F (36.3 °C) -- 93 16 96 % -- --   24 0756 -- -- -- -- -- 93 % -- --   24 0440 (!) 117/48 97.2 °F (36.2 °C) Temporal 95 16 91 % -- --   24 0015 (!) 145/53 97.7 °F (36.5 °C) Temporal 96 16 91 % -- --   24 -- -- -- -- -- 97 % -- --   24 (!) 119/48 97.9 °F (36.6 °C) Temporal 97 20 98 % -- --   24 1839 -- -- -- 91 19 -- -- --   24 1715 111/76 -- -- 98 20 94 % -- --  rhabdomyolysis  - Repeat CK improving  - Renal function normal, no significant electrolyte derangement, will monitor  - Remains n.p.o., change IV fluid composition to IV D5 1/2 NS with potassium at 75 cc/hour, avoid fluid overload    Severe hypothyroidism  - Markedly elevated TSH, low free T4 noted  - N.p.o., will give IV levothyroxine 200 mcg dose today, then maintain on weight-based dosing of 50 mcg IV or convert to oral levothyroxine if able to take p.o.    PT/OT/speech evaluations when appropriate    Poor prognosis    Palliative care consult    Discussion had at bedside with patient's daughter.  Discussed goals of care.  All questions sought and answered    Multiple complex medical problems  Morbidity and mortality high risk  Medical decision making high complexity        Carlos Zimmerman MD 1/20/2024 1:10 PM

## 2024-01-20 NOTE — H&P
[facility]      Hospitalist - History & Physical      PCP: No primary care provider on file.    Date of Admission: 1/19/2024    Date of Service: 1/19/2024    Chief Complaint:  Stroke like symptoms     History Of Present Illness:   The patient is a 68 y.o. female who presented to [facility] ED for evaluation of stroke like symptoms.     Pt lives with her daughter who called EMS as patient was last known well at 11pm last night. Pt is unresponsive. No family is currently present. Pt with noted left sided neglect and right eye deviation.     In ED, neurology was consulted and case discussed with [place] neurosurgeon by ED physician. CT head-Age indeterminate infarcts of the right frontal and right parietal lobe.Left-sided encephalomalacia from prior middle cerebral artery territory infarct.     CTA head/neck-Occluded right common carotid and internal carotid arteries. Occluded left internal carotid artery. Troponin 244, ekg without ischemic change, ck 1,1131, creatinine 0.7/bun 20, glucose 235, wbc 15k, hgb 14.5, platelets 295k, UA micro-negative for bacteria, ABGs-pH 7.42, pCO2 37, pO2 41, o2 sat 77%, UDS positive opiate screen. Pt is admitted inpatient to hospitalist service.    Addendum 1/19/2024 at 2000->Discussion with patient's daughter regarding code status. She tells us that she believes it would be mom's wish not to undergo heroic measures should patient experience cardiopulmonary arrest. She believes that her sister would agree with the decision to make mom's code status \"DNR\".     Past Medical History:        Diagnosis Date    Chronic pain     Expressive aphasia     Hip pain     Low back pain     Muscle spasm     Radiculopathy, lumbar region     Shoulder pain     Stroke (cerebrum) (HCC)        Past Surgical History:    Unknown     Home Medications:  Unknown       Allergies:    Patient has no known allergies.    Social History:    The patient currently lives at home  Tobacco:   has no history on file  There is probable retrograde blood flow in the petrous segment of the right internal carotid artery.  There is diffuse disease within the cavernous segment of the right internal carotid artery.  The left common carotid artery is patent.  The left internal artery is completely occluded.  A left posterior communicating artery is patent.  The basilar artery is patent.  There is probable reconstitution of the bilateral anterior and middle cerebral artery circulation the retrograde blood flow from the left posterior communicating artery.  The left middle cerebral artery is patent.  The anterior cerebral arteries and anterior communicating artery are patent.  The right middle cerebral artery M1 segment is diseased but patent.  There is probable occlusion of the superior division of the right M2 segment.  The inferior division is thought to remain patent.  The lungs are emphysematous.  No obvious neck masses or lymphadenopathy.       - Ectasia of the ascending aorta to 38 mm. - Occluded right common carotid and internal carotid arteries. - Occluded left internal carotid artery. - Collateral reconstitution of the bilateral anterior and middle cerebral arteries occurs through a patent left posterior communicating artery. - Probable acute occlusion of the superior division of the right M2 division of the right middle cerebral artery.     .  All CT scans are performed using dose optimization techniques as appropriate to the performed exam and include at least one of the following: Automated exposure control, adjustment of the mA and/or kV according to size, and the use of iterative reconstruction technique.  ______________________________________ Electronically signed by: TYLER GALVAN D.O. Date:     01/19/2024 Time:    17:44     CT HEAD WO CONTRAST    Result Date: 1/19/2024  EXAM: CT BRAIN WITHOUT CONTRAST    HISTORY:  Stroke symptoms    TECHNIQUE:  CT of the brain without intravenous contrast.  FINDINGS:  No prior

## 2024-01-20 NOTE — PROGRESS NOTES
Automatic Dose Adjustment of                Subcutaneous Anticoagulant for Prophylaxis    Ivette Chu is a 68 y.o. female.     Recent Labs     01/19/24  1714 01/20/24  0212   CREATININE 0.7 0.6       Estimated Creatinine Clearance: 58 mL/min (based on SCr of 0.6 mg/dL).    Weight:  Wt Readings from Last 1 Encounters:   01/19/24 40 kg (88 lb 3.2 oz)           Pharmacy has adjusted subcutaneous anticoagulant for prophylaxis to Enoxaparin 30 mg SC daily based on the patient's weight and estimated CrCl per [facility] policy.               Electronically signed by Nicole Treviño [facility] on 1/20/2024 at 1:19 PM

## 2024-01-20 NOTE — PROGRESS NOTES
4 Eyes Skin Assessment     NAME:  Ivette Chu  YOB: 1955  MEDICAL RECORD NUMBER:  [id number]    The patient is being assessed for  Admission    I agree that at least one RN has performed a thorough Head to Toe Skin Assessment on the patient. ALL assessment sites listed below have been assessed.      Areas assessed by both nurses:    Sacrum. Buttock, Coccyx, Ischium        Does the Patient have a Wound? No noted wound(s)       Zia Prevention initiated by RN: Yes  Wound Care Orders initiated by RN: No    Pressure Injury (Stage 3,4, Unstageable, DTI, NWPT, and Complex wounds) if present, place Wound referral order by RN under : No    New Ostomies, if present place, Ostomy referral order under : No     Nurse 1 eSignature: Electronically signed by Mak Allen RN on 1/20/24 at 2:01 AM CST    **SHARE this note so that the co-signing nurse can place an eSignature**    Nurse 2 eSignature: Electronically signed by Kortney Mendez RN on 1/20/24 at 2:04 AM CST

## 2024-01-20 NOTE — PROGRESS NOTES
Pt had VM pulled off and pulse ox at 79%  Placed back on 50% VM and pulse ox at 93%  Explained the importance of keeping oxygen on

## 2024-01-20 NOTE — ED NOTES
..ED TO INPATIENT SBAR HANDOFF    Patient Name: Ivette hCu   : 1955  68 y.o.   Family/Caregiver Present: Yes  Code Status Order: No Order    C-SSRS:    Sitter No  Restraints:         Situation  Chief Complaint   Patient presents with    Cerebrovascular Accident     Patient's LKW 2300 last night; Patient became very weak, unable to move L side of body. Eye deviated to R side      Brief Description of Patient's Condition: AMS  Mental Status:  non verbal  Arrived from: home    Imaging:   XR CHEST PORTABLE   Final Result   Normal heart size.  Lucent lungs possibly indicating emphysema, correlate clinically.  No definite consolidated pneumonia or acute infiltrate.  There is no vascular congestion, pneumothorax or pleural fluid.  No acute bony finding.                   ______________________________________    Electronically signed by: ELIZABETH FRAZIER M.D.   Date:     2024   Time:    18:35       CTA HEAD NECK W CONTRAST   Final Result       - Ectasia of the ascending aorta to 38 mm.   - Occluded right common carotid and internal carotid arteries.   - Occluded left internal carotid artery.   - Collateral reconstitution of the bilateral anterior and middle cerebral arteries occurs through a patent left posterior communicating artery.   - Probable acute occlusion of the superior division of the right M2 division of the right middle cerebral artery.                   .        All CT scans are performed using dose optimization techniques as appropriate to the performed exam and include    at least one of the following: Automated exposure control, adjustment of the mA and/or kV according to size, and the use of iterative reconstruction technique.        ______________________________________    Electronically signed by: TYLER GALVAN D.O.   Date:     2024   Time:    17:44       CT HEAD WO CONTRAST   Final Result   1.  Age indeterminate infarcts of the right frontal and right parietal lobe.   2.   Inattention (11): (!) Profound josé miguel-inattention or extinction to more than one modality  Total: 33   Active LDA's:   Peripheral IV 01/19/24 Proximal;Right Forearm (Active)   Site Assessment Clean, dry & intact;Clean;Dry;Intact 01/19/24 1725   Line Status Blood return noted;Brisk blood return;Flushed;Normal saline locked;Specimen collected 01/19/24 1725   Phlebitis Assessment No symptoms 01/19/24 1725   Infiltration Assessment 0 01/19/24 1725   Dressing Status New dressing applied 01/19/24 1725   Dressing Type Transparent 01/19/24 1725   Dressing Intervention New 01/19/24 1725     Pertinent or High Risk Medications/Drips: no   If Yes, please provide details:   Blood Product Administration: no  If Yes, please provide details:   Sepsis Risk Score Sepsis Risk Score: 6.11    Admitted with Sepsis? No    Recommendation  Incomplete orders: for US carotid, MRI  Patient Belongings: in bag (socks, pants, shirt)  Additional Comments: Per MD Mendoza, daughter can stay  If any further questions, please call Sending RN at [phone]    Electronically signed by: Electronically signed by Vin Espino RN on 1/19/2024 at 7:53 PM

## 2024-01-21 PROBLEM — I50.22 CHRONIC SYSTOLIC HEART FAILURE (HCC): Status: ACTIVE | Noted: 2024-01-21

## 2024-01-21 LAB
ANION GAP SERPL CALCULATED.3IONS-SCNC: 12 MMOL/L (ref 7–19)
BUN SERPL-MCNC: 12 MG/DL (ref 8–23)
CALCIUM SERPL-MCNC: 8.5 MG/DL (ref 8.8–10.2)
CHLORIDE SERPL-SCNC: 110 MMOL/L (ref 98–111)
CO2 SERPL-SCNC: 22 MMOL/L (ref 22–29)
CREAT SERPL-MCNC: 0.5 MG/DL (ref 0.5–0.9)
ERYTHROCYTE [DISTWIDTH] IN BLOOD BY AUTOMATED COUNT: 15.7 % (ref 11.5–14.5)
GLUCOSE BLD-MCNC: 101 MG/DL (ref 70–99)
GLUCOSE BLD-MCNC: 121 MG/DL (ref 70–99)
GLUCOSE BLD-MCNC: 135 MG/DL (ref 70–99)
GLUCOSE BLD-MCNC: 98 MG/DL (ref 70–99)
GLUCOSE SERPL-MCNC: 162 MG/DL (ref 74–109)
HCT VFR BLD AUTO: 37.4 % (ref 37–47)
HGB BLD-MCNC: 11.6 G/DL (ref 12–16)
MAGNESIUM SERPL-MCNC: 2.1 MG/DL (ref 1.6–2.4)
MCH RBC QN AUTO: 28.2 PG (ref 27–31)
MCHC RBC AUTO-ENTMCNC: 31 G/DL (ref 33–37)
MCV RBC AUTO: 91 FL (ref 81–99)
PERFORMED ON: ABNORMAL
PERFORMED ON: NORMAL
PLATELET # BLD AUTO: 195 K/UL (ref 130–400)
PMV BLD AUTO: 9.8 FL (ref 9.4–12.3)
POTASSIUM SERPL-SCNC: 3.3 MMOL/L (ref 3.5–5)
RBC # BLD AUTO: 4.11 M/UL (ref 4.2–5.4)
SODIUM SERPL-SCNC: 144 MMOL/L (ref 136–145)
WBC # BLD AUTO: 14.4 K/UL (ref 4.8–10.8)

## 2024-01-21 PROCEDURE — 6360000002 HC RX W HCPCS: Performed by: STUDENT IN AN ORGANIZED HEALTH CARE EDUCATION/TRAINING PROGRAM

## 2024-01-21 PROCEDURE — 83735 ASSAY OF MAGNESIUM: CPT

## 2024-01-21 PROCEDURE — 80048 BASIC METABOLIC PNL TOTAL CA: CPT

## 2024-01-21 PROCEDURE — 99231 SBSQ HOSP IP/OBS SF/LOW 25: CPT | Performed by: SURGERY

## 2024-01-21 PROCEDURE — 36415 COLL VENOUS BLD VENIPUNCTURE: CPT

## 2024-01-21 PROCEDURE — 1210000000 HC MED SURG R&B

## 2024-01-21 PROCEDURE — 2580000003 HC RX 258: Performed by: NURSE PRACTITIONER

## 2024-01-21 PROCEDURE — 99233 SBSQ HOSP IP/OBS HIGH 50: CPT | Performed by: PSYCHIATRY & NEUROLOGY

## 2024-01-21 PROCEDURE — 94640 AIRWAY INHALATION TREATMENT: CPT

## 2024-01-21 PROCEDURE — 2500000003 HC RX 250 WO HCPCS: Performed by: STUDENT IN AN ORGANIZED HEALTH CARE EDUCATION/TRAINING PROGRAM

## 2024-01-21 PROCEDURE — 6370000000 HC RX 637 (ALT 250 FOR IP): Performed by: STUDENT IN AN ORGANIZED HEALTH CARE EDUCATION/TRAINING PROGRAM

## 2024-01-21 PROCEDURE — 93970 EXTREMITY STUDY: CPT

## 2024-01-21 PROCEDURE — 51702 INSERT TEMP BLADDER CATH: CPT

## 2024-01-21 PROCEDURE — 82962 GLUCOSE BLOOD TEST: CPT

## 2024-01-21 PROCEDURE — 2580000003 HC RX 258: Performed by: STUDENT IN AN ORGANIZED HEALTH CARE EDUCATION/TRAINING PROGRAM

## 2024-01-21 PROCEDURE — 94761 N-INVAS EAR/PLS OXIMETRY MLT: CPT

## 2024-01-21 PROCEDURE — 93970 EXTREMITY STUDY: CPT | Performed by: SURGERY

## 2024-01-21 PROCEDURE — 2700000000 HC OXYGEN THERAPY PER DAY

## 2024-01-21 PROCEDURE — 6370000000 HC RX 637 (ALT 250 FOR IP): Performed by: NURSE PRACTITIONER

## 2024-01-21 PROCEDURE — 85027 COMPLETE CBC AUTOMATED: CPT

## 2024-01-21 PROCEDURE — 6360000002 HC RX W HCPCS: Performed by: HOSPITALIST

## 2024-01-21 RX ORDER — LORAZEPAM 2 MG/ML
0.5 INJECTION INTRAMUSCULAR EVERY 4 HOURS PRN
Status: DISCONTINUED | OUTPATIENT
Start: 2024-01-21 | End: 2024-01-26

## 2024-01-21 RX ORDER — POTASSIUM CHLORIDE 20 MEQ/1
40 TABLET, EXTENDED RELEASE ORAL PRN
Status: DISCONTINUED | OUTPATIENT
Start: 2024-01-21 | End: 2024-01-26 | Stop reason: HOSPADM

## 2024-01-21 RX ORDER — POTASSIUM CHLORIDE 7.45 MG/ML
10 INJECTION INTRAVENOUS PRN
Status: DISCONTINUED | OUTPATIENT
Start: 2024-01-21 | End: 2024-01-26 | Stop reason: HOSPADM

## 2024-01-21 RX ADMIN — LORAZEPAM 0.5 MG: 2 INJECTION INTRAMUSCULAR; INTRAVENOUS at 20:00

## 2024-01-21 RX ADMIN — IPRATROPIUM BROMIDE AND ALBUTEROL SULFATE 1 DOSE: 2.5; .5 SOLUTION RESPIRATORY (INHALATION) at 14:44

## 2024-01-21 RX ADMIN — ASPIRIN 300 MG: 300 SUPPOSITORY RECTAL at 09:08

## 2024-01-21 RX ADMIN — PIPERACILLIN AND TAZOBACTAM 3375 MG: 3; .375 INJECTION, POWDER, LYOPHILIZED, FOR SOLUTION INTRAVENOUS at 09:05

## 2024-01-21 RX ADMIN — IPRATROPIUM BROMIDE AND ALBUTEROL SULFATE 1 DOSE: 2.5; .5 SOLUTION RESPIRATORY (INHALATION) at 11:21

## 2024-01-21 RX ADMIN — IPRATROPIUM BROMIDE AND ALBUTEROL SULFATE 1 DOSE: 2.5; .5 SOLUTION RESPIRATORY (INHALATION) at 07:21

## 2024-01-21 RX ADMIN — PIPERACILLIN AND TAZOBACTAM 3375 MG: 3; .375 INJECTION, POWDER, LYOPHILIZED, FOR SOLUTION INTRAVENOUS at 01:37

## 2024-01-21 RX ADMIN — POTASSIUM CHLORIDE 10 MEQ: 7.46 INJECTION, SOLUTION INTRAVENOUS at 09:06

## 2024-01-21 RX ADMIN — LEVOTHYROXINE SODIUM 50 MCG: 20 INJECTION, SOLUTION INTRAVENOUS at 09:04

## 2024-01-21 RX ADMIN — IPRATROPIUM BROMIDE AND ALBUTEROL SULFATE 1 DOSE: 2.5; .5 SOLUTION RESPIRATORY (INHALATION) at 20:01

## 2024-01-21 RX ADMIN — ACETYLCYSTEINE 600 MG: 200 SOLUTION ORAL; RESPIRATORY (INHALATION) at 20:01

## 2024-01-21 RX ADMIN — POTASSIUM CHLORIDE 10 MEQ: 7.46 INJECTION, SOLUTION INTRAVENOUS at 14:55

## 2024-01-21 RX ADMIN — SODIUM CHLORIDE, PRESERVATIVE FREE 10 ML: 5 INJECTION INTRAVENOUS at 09:08

## 2024-01-21 RX ADMIN — ACETYLCYSTEINE 600 MG: 200 SOLUTION ORAL; RESPIRATORY (INHALATION) at 07:21

## 2024-01-21 NOTE — PROGRESS NOTES
Physical Therapy  Name: Ivette Chu  MRN:  [id number]  Date of service:  1/21/2024    Pt on hold for physical therapy at this time due to awaiting testing and pt unable to meaningfully participate in therapy. Per nursing and Dr. Aranda's note, pt lying in fetal position and resisting movement. Will f/u at a later time.    Electronically signed by Lindsay Scott, PT on 1/21/2024 at 4:27 PM

## 2024-01-21 NOTE — PROGRESS NOTES
Vascular lab preliminary results.  Bilateral lower extremity venous duplex scan performed.   No evidence of DVT, SVT, or reflux noted at this time.   Technically difficult and limited study.   Final report pending.

## 2024-01-21 NOTE — PROGRESS NOTES
exam    General: Ill-appearing, somnolent but arousable, frail, appears malnourished  Cardiac: Normal rate, regular rhythm  Respiratory: Diminished bilaterally, no wheezes  Abdomen: Nondistended, nontender  Extremities: No edema  Neurologic: Somnolent but arousable, minimal speech with aphasia, left hemiparesis      Lab Review   Recent Results (from the past 24 hour(s))   POCT Glucose    Collection Time: 01/20/24 12:21 PM   Result Value Ref Range    POC Glucose 108 (H) 70 - 99 mg/dl    Performed on AccuChek    POCT Glucose    Collection Time: 01/20/24  1:19 PM   Result Value Ref Range    POC Glucose 93 70 - 99 mg/dl    Performed on AccuChek    POCT Glucose    Collection Time: 01/20/24  4:34 PM   Result Value Ref Range    POC Glucose 109 (H) 70 - 99 mg/dl    Performed on AccuChek    POCT Glucose    Collection Time: 01/20/24  8:24 PM   Result Value Ref Range    POC Glucose 178 (H) 70 - 99 mg/dl    Performed on AccuChek    CBC    Collection Time: 01/21/24  2:04 AM   Result Value Ref Range    WBC 14.4 (H) 4.8 - 10.8 K/uL    RBC 4.11 (L) 4.20 - 5.40 M/uL    Hemoglobin 11.6 (L) 12.0 - 16.0 g/dL    Hematocrit 37.4 37.0 - 47.0 %    MCV 91.0 81.0 - 99.0 fL    MCH 28.2 27.0 - 31.0 pg    MCHC 31.0 (L) 33.0 - 37.0 g/dL    RDW 15.7 (H) 11.5 - 14.5 %    Platelets 195 130 - 400 K/uL    MPV 9.8 9.4 - 12.3 fL   Basic Metabolic Panel w/ Reflex to MG    Collection Time: 01/21/24  2:04 AM   Result Value Ref Range    Sodium 144 136 - 145 mmol/L    Potassium reflex Magnesium 3.3 (L) 3.5 - 5.0 mmol/L    Chloride 110 98 - 111 mmol/L    CO2 22 22 - 29 mmol/L    Anion Gap 12 7 - 19 mmol/L    Glucose 162 (H) 74 - 109 mg/dL    BUN 12 8 - 23 mg/dL    Creatinine 0.5 0.5 - 0.9 mg/dL    Est, Glom Filt Rate >60 >60    Calcium 8.5 (L) 8.8 - 10.2 mg/dL   Magnesium    Collection Time: 01/21/24  2:04 AM   Result Value Ref Range    Magnesium 2.1 1.6 - 2.4 mg/dL   POCT Glucose    Collection Time: 01/21/24  7:35 AM   Result Value Ref Range    POC Glucose  however will hold off given high risk of hemorrhagic transformation with her large stroke  --Obtain lower extremity Dopplers  -- Status and plan of care discussed with vascular surgery    Probable aspiration pneumonia versus pneumonitis involving right lower lung  -- Continue empiric treatment with IV Zosyn    COPD with likely mucous plugging and near complete atelectasis of right middle lobe noted on CT chest  -- DuoNebs every 4 hours  - Continue Mucomyst for now  -- Need aggressive pulmonary toileting    Acute hypoxemic respiratory failure  - Continue high flow supplemental oxygen, currently on Ventimask due to mouth breathing, maintain SpO2 goal 88-92%, wean supplemental oxygen as tolerated      Nontraumatic rhabdomyolysis  - Improving  - Renal function normal, no significant electrolyte derangement, will monitor  - Remains n.p.o., continue low rate of IV fluid D5 1/2 NS with potassium, reduced to 50 cc/hour, avoid pulmonary edema given her systolic heart failure    Likely chronic systolic heart failure  - Echo reviewed, EF 40%  -- Hold off initiation of heart failure therapies while allowing permissive hypertension and currently n.p.o.  -- Will consider initiating beta-blocker and ACE inhibitor/ARB when appropriate    Severe hypothyroidism  - Markedly elevated TSH, low free T4 noted  - N.p.o. status, s/p dose of IV levothyroxine 200 mcg, continue IV levothyroxine 50 mcg daily until able to take oral medication    PT/OT/speech evaluations     Poor prognosis    Palliative care consult    Had another discussion had at bedside with patient's daughter regarding patient's multiple acute medical problems with overall poor prognosis in setting of her respiratory failure with large stroke.  Discussed goals of care.  All questions sought and answered    Multiple complex medical problems  Morbidity and mortality high risk  Medical decision making high complexity        Carlos Zimmerman MD 1/21/2024 11:33 AM

## 2024-01-21 NOTE — PROGRESS NOTES
Patient name: Ivette Chu  MRN: [id number]  YOB: 1955    Date of service: 1/21/2024    Vascular surgery progress note    Patient seen and examined.  She is curled up pretty tightly into the fetal position on the bed and has some resisted movements with her left upper and lower extremity, no active movements noted.  She is able to move her right upper and lower extremity.  Her speech is mildly improved today.  She is still not really following commands.  But she seems stable.  Her respiratory status is unchanged.    On exam, vital signs remained stable.  Temperature 97.2 °F HR 88 RR 22 /70 SpO2 100% on 15 L Ventimask  Patient is very thin and frail, she is resting in the bed, but seems mildly agitated and fidgety  Patient opens her eyes to stimulation, minimally follows commands, some resistance to movement in the left upper and lower extremity, no active movement noted, moves the right upper and lower extremity spontaneously.  Breath sounds are coarse bilaterally, diminished left basilar  Regular rate and rhythm  Abdomen soft nontender  Bilateral upper and lower extremities are warm, pulses not palpable, Doppler signals present, monophasic.    No swelling in the bilateral lower extremities.    Labs: WBC 14.4 Hgb 11.6 HCT 37.4    4K3.3  CO2 22 BUN 12 CR 0.5     MRI of the brain still pending  Venous duplex bilateral lower extremity still pending    Assessment and plan: Ms. Chu is a 68-year-old patient with history of chronic left-sided stroke, now with acute right-sided CVA, prognosis unclear.  Hopefully we will have a better idea of prognosis after reviewing her MRI and observing her progress over the next few days.  1.  The patient has 2 small segmental upper and lower left-sided PE.  The majority of her respiratory insufficiency seems to be a combination of chronic COPD/emphysema plus likely an acute right-sided aspiration related to her CVA with complete atelectasis of

## 2024-01-21 NOTE — PROGRESS NOTES
[facility] Neurology Progress Note      Patient:   Ivette Chu  MR#:    [id number]   Room:    [facility]/535-01   YOB: 1955  Date of Progress Note: 1/21/2024  Time of Note                           9:51 AM  Consulting Physician:  Zeus Corona DO  Attending Physician:  Carlos Zimmerman MD      INTERVAL HISTORY:  Speech improving, weakness unchanged, no new focal complaints.     REVIEW OF SYSTEMS:  Constitutional: No fevers   Neck:No stiffness  Respiratory: No shortness of breath  Cardiovascular: No chest pain   Gastrointestinal: No abdominal pain    Genitourinary: No Dysuria  Neurological: No headache    PHYSICAL EXAM:    Constitutional -   BP (!) 143/70   Pulse 88   Temp 97.2 °F (36.2 °C) (Temporal)   Resp 22   Ht 1.549 m (5' 1\")   Wt 40 kg (88 lb 3.2 oz)   SpO2 100%   BMI 16.67 kg/m²   General appearance: No acute distress   EYES -   Conjunctiva normal  Pupillary exam as below, see CN exam in the neurologic exam  ENT-    No scars, masses, or lesions over external nose or ears  Oropharynx without erythema, palate midline  Cardiovascular -   No clubbing, cyanosis, or edema   Pulmonary-   Good expansion, normal effort without use of accessory muscles  Musculoskeletal -   No significant wasting of muscles noted  Gait as below, see gait exam in the neurologic exam  Muscle strength, tone, stability as below see the motor exam in the neurologic exam.   No bony deformities  Skin -   Warm, dry, and intact to inspection and palpation.    No rash, erythema, or pallor        NEUROLOGICAL EXAM     Mental status    [] Awake, alert, oriented   [] Affect attention and concentration appear appropriate  [] Recent and remote memory appears unremarkable  [] Speech normal without dysarthria or aphasia, comprehension and repetition intact.   COMMENTS:Speech improving    Cranial Nerves [] No VF deficit to confrontation,  optic discs normal, no papilledema on fundoscopic exam.  [] PERRLA, EOMI, no nystagmus,  conjugate eye movements, no ptosis  [] Face symmetric  [] Facial sensation intact  [] Tongue midline no atrophy or fasciculations present  [] Palate midline, hearing to finger rub normal  [] Shoulder shrug and SCM testing normal  COMMENTS: Right gaze preference, facial asymmetry    Motor   [] 5/5 strength x 4 extremities  [x] Normal bulk and tone  [x] No tremor present  [x] No rigidity or bradykinesia noted  COMMENTS:Left sided weakness, flaccid    Sensory  [] Sensation intact to light touch, pin prick, vibration, and proprioception BLE  [] Sensation intact to light touch, pin prick, vibration, and proprioception BUE  COMMENTS: Limited    Coordination [] FTN normal bilaterally   [] HTS normal bilaterally  [] LEONELA normal.   COMMENTS: Limited    Reflexes  [x] Symmetric and non-pathological  [x] Toes downgoing bilaterally  [x] No clonus present  COMMENTS:   Gait                  [] Normal steady gait    [] Ataxic    [] Spastic     [] Magnetic     [] Shuffling  [x] Not assessed  COMMENTS:        LABS/IMAGING:    CTA PULMONARY W CONTRAST    Result Date: 1/20/2024  EXAM: CHEST CTA WITH CONTRAST (PULMONARY ARTERY)  HISTORY: Acute hypoxemia, tachycardia  TECHNIQUE: CTA acquisition of the chest from the thoracic inlet to the upper abdomen following IV contrast administration timed to filling of the pulmonary artery.  Sagittal, coronal and oblique MPR reformats, and sagittal MIP reformats were obtained.  CT Dose Reduction Techniques Employed: Yes  COMPARISON: 01/19/2024 chest x-ray  FINDINGS: Lines, Tubes, Devices: None.  Pulmonary Embolism: No main pulmonary artery emboli identified.  There is a left upper lobar pulmonary embolus.  Left upper and lower lobe segmental pulmonary artery emboli are identified.  There is no evidence of right heart strain.  Lung Parenchyma and Airways: Opacification of some right lower lobe bronchioles is seen.  There is near complete atelectasis of the right middle lobe. Right lower lobe tree in

## 2024-01-22 ENCOUNTER — APPOINTMENT (OUTPATIENT)
Dept: GENERAL RADIOLOGY | Age: 69
End: 2024-01-22
Payer: MEDICARE

## 2024-01-22 PROBLEM — Z51.5 PALLIATIVE CARE PATIENT: Status: ACTIVE | Noted: 2024-01-22

## 2024-01-22 LAB
ANION GAP SERPL CALCULATED.3IONS-SCNC: 8 MMOL/L (ref 7–19)
BUN SERPL-MCNC: 12 MG/DL (ref 8–23)
CALCIUM SERPL-MCNC: 9.1 MG/DL (ref 8.8–10.2)
CHLORIDE SERPL-SCNC: 111 MMOL/L (ref 98–111)
CO2 SERPL-SCNC: 25 MMOL/L (ref 22–29)
CREAT SERPL-MCNC: 0.5 MG/DL (ref 0.5–0.9)
ERYTHROCYTE [DISTWIDTH] IN BLOOD BY AUTOMATED COUNT: 15.8 % (ref 11.5–14.5)
GLUCOSE BLD-MCNC: 101 MG/DL (ref 70–99)
GLUCOSE BLD-MCNC: 80 MG/DL (ref 70–99)
GLUCOSE BLD-MCNC: 87 MG/DL (ref 70–99)
GLUCOSE SERPL-MCNC: 98 MG/DL (ref 74–109)
HCT VFR BLD AUTO: 37.3 % (ref 37–47)
HGB BLD-MCNC: 11.6 G/DL (ref 12–16)
MCH RBC QN AUTO: 28.4 PG (ref 27–31)
MCHC RBC AUTO-ENTMCNC: 31.1 G/DL (ref 33–37)
MCV RBC AUTO: 91.2 FL (ref 81–99)
PERFORMED ON: ABNORMAL
PERFORMED ON: NORMAL
PERFORMED ON: NORMAL
PLATELET # BLD AUTO: 167 K/UL (ref 130–400)
PMV BLD AUTO: 10.3 FL (ref 9.4–12.3)
POTASSIUM SERPL-SCNC: 3.8 MMOL/L (ref 3.5–5)
RBC # BLD AUTO: 4.09 M/UL (ref 4.2–5.4)
SODIUM SERPL-SCNC: 144 MMOL/L (ref 136–145)
WBC # BLD AUTO: 13.3 K/UL (ref 4.8–10.8)

## 2024-01-22 PROCEDURE — 6370000000 HC RX 637 (ALT 250 FOR IP): Performed by: STUDENT IN AN ORGANIZED HEALTH CARE EDUCATION/TRAINING PROGRAM

## 2024-01-22 PROCEDURE — 1210000000 HC MED SURG R&B

## 2024-01-22 PROCEDURE — 97530 THERAPEUTIC ACTIVITIES: CPT

## 2024-01-22 PROCEDURE — 2700000000 HC OXYGEN THERAPY PER DAY

## 2024-01-22 PROCEDURE — 99233 SBSQ HOSP IP/OBS HIGH 50: CPT | Performed by: PSYCHIATRY & NEUROLOGY

## 2024-01-22 PROCEDURE — 85027 COMPLETE CBC AUTOMATED: CPT

## 2024-01-22 PROCEDURE — 97165 OT EVAL LOW COMPLEX 30 MIN: CPT

## 2024-01-22 PROCEDURE — 71045 X-RAY EXAM CHEST 1 VIEW: CPT

## 2024-01-22 PROCEDURE — 6360000002 HC RX W HCPCS: Performed by: HOSPITALIST

## 2024-01-22 PROCEDURE — 36415 COLL VENOUS BLD VENIPUNCTURE: CPT

## 2024-01-22 PROCEDURE — 2500000003 HC RX 250 WO HCPCS: Performed by: STUDENT IN AN ORGANIZED HEALTH CARE EDUCATION/TRAINING PROGRAM

## 2024-01-22 PROCEDURE — 97161 PT EVAL LOW COMPLEX 20 MIN: CPT

## 2024-01-22 PROCEDURE — 51702 INSERT TEMP BLADDER CATH: CPT

## 2024-01-22 PROCEDURE — 6370000000 HC RX 637 (ALT 250 FOR IP): Performed by: NURSE PRACTITIONER

## 2024-01-22 PROCEDURE — 99223 1ST HOSP IP/OBS HIGH 75: CPT

## 2024-01-22 PROCEDURE — 82962 GLUCOSE BLOOD TEST: CPT

## 2024-01-22 PROCEDURE — 6360000002 HC RX W HCPCS: Performed by: STUDENT IN AN ORGANIZED HEALTH CARE EDUCATION/TRAINING PROGRAM

## 2024-01-22 PROCEDURE — 80048 BASIC METABOLIC PNL TOTAL CA: CPT

## 2024-01-22 PROCEDURE — 2580000003 HC RX 258: Performed by: STUDENT IN AN ORGANIZED HEALTH CARE EDUCATION/TRAINING PROGRAM

## 2024-01-22 PROCEDURE — 94761 N-INVAS EAR/PLS OXIMETRY MLT: CPT

## 2024-01-22 PROCEDURE — 94640 AIRWAY INHALATION TREATMENT: CPT

## 2024-01-22 PROCEDURE — 2580000003 HC RX 258: Performed by: NURSE PRACTITIONER

## 2024-01-22 RX ADMIN — IPRATROPIUM BROMIDE AND ALBUTEROL SULFATE 1 DOSE: 2.5; .5 SOLUTION RESPIRATORY (INHALATION) at 20:14

## 2024-01-22 RX ADMIN — SODIUM CHLORIDE, PRESERVATIVE FREE 10 ML: 5 INJECTION INTRAVENOUS at 10:06

## 2024-01-22 RX ADMIN — LEVOTHYROXINE SODIUM 50 MCG: 20 INJECTION, SOLUTION INTRAVENOUS at 10:13

## 2024-01-22 RX ADMIN — LORAZEPAM 0.5 MG: 2 INJECTION INTRAMUSCULAR; INTRAVENOUS at 21:12

## 2024-01-22 RX ADMIN — PIPERACILLIN AND TAZOBACTAM 3375 MG: 3; .375 INJECTION, POWDER, LYOPHILIZED, FOR SOLUTION INTRAVENOUS at 18:12

## 2024-01-22 RX ADMIN — ENOXAPARIN SODIUM 30 MG: 100 INJECTION SUBCUTANEOUS at 10:03

## 2024-01-22 RX ADMIN — IPRATROPIUM BROMIDE AND ALBUTEROL SULFATE 1 DOSE: 2.5; .5 SOLUTION RESPIRATORY (INHALATION) at 15:26

## 2024-01-22 RX ADMIN — ASPIRIN 300 MG: 300 SUPPOSITORY RECTAL at 10:04

## 2024-01-22 RX ADMIN — PIPERACILLIN AND TAZOBACTAM 3375 MG: 3; .375 INJECTION, POWDER, LYOPHILIZED, FOR SOLUTION INTRAVENOUS at 03:07

## 2024-01-22 RX ADMIN — IPRATROPIUM BROMIDE AND ALBUTEROL SULFATE 1 DOSE: 2.5; .5 SOLUTION RESPIRATORY (INHALATION) at 11:27

## 2024-01-22 RX ADMIN — PIPERACILLIN AND TAZOBACTAM 3375 MG: 3; .375 INJECTION, POWDER, LYOPHILIZED, FOR SOLUTION INTRAVENOUS at 10:16

## 2024-01-22 RX ADMIN — IPRATROPIUM BROMIDE AND ALBUTEROL SULFATE 1 DOSE: 2.5; .5 SOLUTION RESPIRATORY (INHALATION) at 07:56

## 2024-01-22 NOTE — ACP (ADVANCE CARE PLANNING)
Advance Care Planning     Advance Care Planning (ACP) Physician/NP/PA (Provider) Conversation      Date of ACP Conversation: 1/22/2024    Conversation Conducted with: pts daughter at bedside    Health Care Decision Maker:  Current Designated Health Care Decision Maker:      Primary Decision Maker: ANNIE BRUNO - Child - [phone]    Primary Decision Maker: ALFONSO BRUNO - Child    Care Preferences:  Ventilation:  No    Resuscitation:  No    Conversation Outcomes / Follow-Up Plan:   Patient's daughter, Annie, at bedside denies any ACP/POA documents.  She confirms that patient is  and has one other daughter.  Explained legal [place] and state of [place] and that by law patient's adult children would have equal decision-making rights. Annie reports that her sister plans to be at bedside in the coming days and that she is keeping her sister up to date on medical care and decisions. Reviewed code status and discussed meaning of \"full code\" versus \"DNR\". Annie confirms that they wish to continue current medical treatment/workup in hopes of some recovery post CVA but that family does wish to maintain DNR status in the event of cardiac or respiratory arrest.     Length of Voluntary ACP Conversation in minutes: 11 minutes during consult visit    Fitz Santana, APRN - CNP

## 2024-01-22 NOTE — CONSULTS
Palliative Care Consult Note    1/22/2024 8:48 AM  Subjective:  Admit Date: 1/19/2024  PCP: Vin Espino RN    Date of Service: 1/22/2024    Reason for Consultation:  Goals of Care, Code Status, Family Support     History Obtained From: EMR/Patient and their Family    History Of Present Illness:   The patient is a 68 y.o. female with PMH COPD, PAD, CVA with expressive aphasia and chronic back pain who presented to [facility] ED 1/19/2024 complaining of decreased responsiveness.  She was brought to ED by family with strokelike symptoms on arrival and last known well time around 2300 the night prior to admission.  Symptoms included flaccid left-sided weakness along with left-sided neglect and right eye deviation. CT head showed ischemic changes in the right MCA distribution. CTA of the head and neck revealed an occluded common carotid and internal carotid artery on the right as well as an occluded left internal carotid artery. The case was discussed by the emergency department physician with [facility]. They did not feel she would be an interventional candidate.  She was also found to have acute hypoxemia and tachycardia and was placed on high flow O2 via Ventimask.  CTA chest obtained showed evidence of multiple pulmonary emboli.  Was not placed on therapeutic anticoagulation due to high risk of hemorrhagic transformation of large stroke per neurology.  She was admitted under hospitalist services with neurology following and vascular surgery consulted to evaluate for possibility of IVC filter placement.  Bilateral lower extremity duplex did not show any obvious DVT so no intervention planned for filter placement at this time.  Palliative care is consulted for goals of care discussions, symptom management, and end-stage disease.    Past Medical History:        Diagnosis Date    Chronic pain     Expressive aphasia     Hip pain     Low back pain     Muscle spasm     Radiculopathy, lumbar region     Shoulder pain     Stroke    BUN 17 12 12   CREATININE 0.6 0.5 0.5   CALCIUM 8.9 8.5* 9.1     Recent Labs     01/19/24  1714   AST 44*   ALT 11   BILITOT 0.7   ALKPHOS 165*       CTA PULMONARY W CONTRAST    Result Date: 1/20/2024  EXAM: CHEST CTA WITH CONTRAST (PULMONARY ARTERY)  HISTORY: Acute hypoxemia, tachycardia  TECHNIQUE: CTA acquisition of the chest from the thoracic inlet to the upper abdomen following IV contrast administration timed to filling of the pulmonary artery.  Sagittal, coronal and oblique MPR reformats, and sagittal MIP reformats were obtained.  CT Dose Reduction Techniques Employed: Yes  COMPARISON: 01/19/2024 chest x-ray  FINDINGS: Lines, Tubes, Devices: None.  Pulmonary Embolism: No main pulmonary artery emboli identified.  There is a left upper lobar pulmonary embolus.  Left upper and lower lobe segmental pulmonary artery emboli are identified.  There is no evidence of right heart strain.  Lung Parenchyma and Airways: Opacification of some right lower lobe bronchioles is seen.  There is near complete atelectasis of the right middle lobe. Right lower lobe tree in bud nodularity is present.  Emphysema noted.  Left upper lobe calcified granuloma.  Pleural Space: No pleural effusion.  No pleural thickening.  No pneumothorax.  Thoracic Inlet, Mediastinum, and Carly: The thyroid is not well seen.  No lymphadenopathy.  Heart, Vessels, and Pericardium: Heart size is normal.  There is no pericardial effusion or pericardial thickening.  Atherosclerotic calcifications are present including coronary arteries.  Status post CABG.  Bones and Soft Tissues: Degenerative changes of the spine are noted.  Chest wall soft tissues are within normal limits.  Upper Abdomen: Hepatic cysts are seen.  The infrarenal abdominal aorta is ectatic measuring at least 3.2 cm.      Left upper lobar pulmonary embolus.  Left upper lobe and lower lobe segmental pulmonary artery emboli.  No evidence of right heart strain.  Opacification of some right lower

## 2024-01-22 NOTE — PROGRESS NOTES
Physical Therapy  Facility/Department: [facility] SURG SERVICES  Physical Therapy Initial Assessment    Name: Ivette Chu  : 1955  MRN: [id number]  Date of Service: 2024    Discharge Recommendations:  Continue to assess pending progress, Patient would benefit from continued therapy after discharge (ANTICIPATE DC TO FACILITY FOR REHAB SERVICES)          Patient Diagnosis(es): The primary encounter diagnosis was Acute ischemic right MCA stroke (HCC). Diagnoses of Carotid occlusion, bilateral, Hx of CABG, and Acute respiratory failure with hypoxia (HCC) were also pertinent to this visit.  Past Medical History:  has a past medical history of Chronic pain, Expressive aphasia, Hip pain, Low back pain, Muscle spasm, Palliative care patient, Radiculopathy, lumbar region, Shoulder pain, and Stroke (cerebrum) (HCC).  Past Surgical History:  has no past surgical history on file.    Assessment   Body Structures, Functions, Activity Limitations Requiring Skilled Therapeutic Intervention: Decreased functional mobility ;Decreased ROM;Decreased strength;Decreased safe awareness;Decreased endurance;Decreased balance;Decreased cognition;Decreased sensation;Decreased fine motor control;Decreased coordination;Decreased vision/visual deficit  Assessment: pt WOULD BENEFIT FROM SKILLED PT IN THIS SETTING TO ADDRESS HER MOBILITY/STRENGTH DEFICITS  Therapy Prognosis: Good  Decision Making: Low Complexity  Requires PT Follow-Up: Yes  Activity Tolerance  Activity Tolerance: Patient tolerated treatment well     Plan   Physical Therapy Plan  General Plan: 5-7 times per week  Therapy Duration: 2 Weeks  Current Treatment Recommendations: Strengthening, ROM, Balance training, Functional mobility training, Transfer training, Gait training, Safety education & training, Therapeutic activities, Positioning, Patient/Caregiver education & training  Additional Comments: CONTINUE TO WORK ON POSITIONING, ROM, AWARENESS OF L SIDE,AND BED MOBILITY.

## 2024-01-22 NOTE — PROGRESS NOTES
Daily Progress Note    Date:2024  Patient: Ivette Chu  : 1955  MRN:[id number]  CODE:DNR No additional code details  PCP:Vin Espino RN    Admit Date: 2024  5:08 PM   LOS: 3 days       Subjective:   Continues to require high flow supplemental O2, currently still on Ventimask.  minimally interactive, not moving left arm.         Summary: 68-year-old female with COPD, PAD, history of stroke with expressive aphasia, presented via EMS with family minimally responsive, last known normal evening prior to presentation.  Noted left-sided neglect and right eye deviation, noted evidence of stroke with infarcts in right frontal and parietal lobes, left encephalomalacia with prior MCA infarct; CTA head/neck showed occluded right common and bilateral ICA occlusions and concern for acute occlusion of a portion of right MCA.  ED discussed with [facility], however not deemed candidate for a neurointerventional procedure.  Neurology consulted.  Patient also with acute hypoxemia confirmed on ABG, required high flow O2 via Ventimask along with tachycardia.  CTA chest obtained showed evidence of multiple pulmonary emboli.  Considered therapeutic anticoagulation, however noted high risk of hemorrhagic transformation of her large stroke per neurology.  Vascular surgery consulted to evaluate for possibility of IVC filter placement in lieu of anticoagulation, however lower extremity Dopplers did not show any obvious DVT so no intervention planned.        Objective:      Vital signs in last 24 hours:  Patient Vitals for the past 24 hrs:   BP Temp Temp src Pulse Resp SpO2   24 1114 (!) 134/48 97.3 °F (36.3 °C) Temporal 78 14 100 %   24 0803 (!) 119/98 97.3 °F (36.3 °C) Temporal 82 16 100 %   24 0756 -- -- -- -- -- 99 %   24 0444 (!) 133/58 97.9 °F (36.6 °C) Temporal 73 17 100 %   24 0018 126/62 98.1 °F (36.7 °C) Temporal 96 14 99 %   24 (!) 111/55 97.5 °F (36.4 °C) Temporal 83 14 99 %  transformation given significant stroke, may reconsider depending on results of MRI brain  --Lower extremity Dopplers did not show any obvious DVT  --Vascular surgery evaluated, but no intervention needed    Probable aspiration pneumonia versus pneumonitis involving right lower lung  -- Continue empiric treatment with IV Zosyn    COPD with likely mucous plugging and near complete atelectasis of right middle lobe noted on CT chest  -- DuoNebs every 4 hours  -Received Mucomyst treatments x 2 days, will discontinue  -- Need aggressive pulmonary toileting    Acute hypoxemic respiratory failure  - Continue high flow supplemental oxygen, currently on Ventimask due to mouth breathing, maintain SpO2 goal 88-92%, wean supplemental oxygen as tolerated      Nontraumatic rhabdomyolysis  - Improving  - Renal function normal, no significant electrolyte derangement, will monitor  - Remains n.p.o., continue low rate of IV fluid D5 1/2 NS with potassium at 50 cc/hour    systolic heart failure, likely chronic  - Echo reviewed, EF 40%  -- Hold off initiation of heart failure therapies while allowing permissive hypertension and currently n.p.o.  -- Will consider initiating beta-blocker and ACE inhibitor/ARB when appropriate    Severe hypothyroidism  - Markedly elevated TSH, low free T4 noted  - N.p.o. status, continue IV levothyroxine 50 mcg daily for now with transition to oral levothyroxine when able    PT/OT/speech evaluations     Poor prognosis    Palliative care consult    Had another discussion had at bedside with patient's daughter regarding patient's multiple acute medical problems with overall poor prognosis in setting of her respiratory failure with large stroke.  Discussed goals of care.  All questions sought and answered    Multiple complex medical problems  Morbidity and mortality high risk  Medical decision making high complexity        Carlos Zimmerman MD 1/22/2024 12:10 PM

## 2024-01-22 NOTE — PROGRESS NOTES
[facility] Neurology Progress Note      Patient:   Ivette Chu  MR#:    [id number]   Room:    [facility]535-   YOB: 1955  Date of Progress Note: 1/22/2024  Time of Note                           9:05 AM  Consulting Physician:  Zeus Corona DO  Attending Physician:  Carlos Zimmerman MD      INTERVAL HISTORY:  Speech improving, weakness unchanged, no new focal complaints, doing about the same overnight.     REVIEW OF SYSTEMS:  Constitutional: No fevers   Neck:No stiffness  Respiratory: No shortness of breath  Cardiovascular: No chest pain   Gastrointestinal: No abdominal pain    Genitourinary: No Dysuria  Neurological: No headache    PHYSICAL EXAM:    Constitutional -   BP (!) 119/98   Pulse 82   Temp 97.3 °F (36.3 °C) (Temporal)   Resp 16   Ht 1.549 m (5' 1\")   Wt 40 kg (88 lb 3.2 oz)   SpO2 100%   BMI 16.67 kg/m²   General appearance: No acute distress   EYES -   Conjunctiva normal  Pupillary exam as below, see CN exam in the neurologic exam  ENT-    No scars, masses, or lesions over external nose or ears  Oropharynx without erythema, palate midline  Cardiovascular -   No clubbing, cyanosis, or edema   Pulmonary-   Good expansion, normal effort without use of accessory muscles  Musculoskeletal -   No significant wasting of muscles noted  Gait as below, see gait exam in the neurologic exam  Muscle strength, tone, stability as below see the motor exam in the neurologic exam.   No bony deformities  Skin -   Warm, dry, and intact to inspection and palpation.    No rash, erythema, or pallor        NEUROLOGICAL EXAM     Mental status    [] Awake, alert, oriented   [] Affect attention and concentration appear appropriate  [] Recent and remote memory appears unremarkable  [] Speech normal without dysarthria or aphasia, comprehension and repetition intact.   COMMENTS:Speech improving    Cranial Nerves [] No VF deficit to confrontation,  optic discs normal, no papilledema on fundoscopic exam.  []  reduced EF, largely negative from a stroke standpoint.      PLAN:   Follow up MRI    Follow tele    ASA, statin, permissive hypertension, supportive care    Vascular surgery following, LE dopplers negative, recommended anticoagulation once safe from a stroke standpoint, no need for IVC filter, awaiting MRI brain to help risk stratify anticoagulation.  Continue antiplatelet therapy for now.    PT, OT, ST    Please feel free to call with any questions.   [phone] (cell phone).      Zeus Corona DO  Board Certified Neurology

## 2024-01-22 NOTE — PROGRESS NOTES
Nutrition Assessment     Type and Reason for Visit: Reassess    Nutrition Recommendations/Plan:   Recommend consider alternative means of nutrition d/t continued NPO status per SLP and dx of Severe Malnutrition.     Malnutrition Assessment:  Malnutrition Status: Severe malnutrition    Nutrition Assessment:  Pt remains malnourished AEB continued NPO status. SLP last eval recommended continued NPO status. Glucose  last 24hrs. Pt receiving D5% in 1/2 NS with KCL @ 50 ml/hr. Recommend consider EN, as now NPO day 3, to prevent continued nutritional decline.    Estimated Daily Nutrient Needs:  Energy (kcal):  6847-9123 kcals/day Weight Used for Energy Requirements: Current     Protein (g):  60-80 g/protein/day Weight Used for Protein Requirements: Current        Fluid (ml/day):  5743-5623 mL/day Method Used for Fluid Requirements: 1 ml/kcal    Nutrition Related Findings:     Wound Type: None    Current Nutrition Therapies:    Diet NPO Exceptions are: Ice Chips    Anthropometric Measures:  Height: 154.9 cm (5' 1\")  Current Body Wt: 40 kg (88 lb 2.9 oz)   BMI: 16.7    Nutrition Diagnosis:   Severe malnutrition, In context of chronic illness related to inadequate protein-energy intake as evidenced by Criteria as identified in malnutrition assessment    Nutrition Interventions:   Food and/or Nutrient Delivery: Continue NPO, IV Fluid Delivery     Coordination of Nutrition Care: Continue to monitor while inpatient       Goals:     Goals: Initiate PO diet, Initiate nutrition support       Nutrition Monitoring and Evaluation:      Food/Nutrient Intake Outcomes: Diet Advancement/Tolerance, IVF Intake  Physical Signs/Symptoms Outcomes: Biochemical Data, Nutrition Focused Physical Findings, Weight    Discharge Planning:    Too soon to determine     Kate Larson, MS, RD, LD  Contact: [phone]

## 2024-01-22 NOTE — PROGRESS NOTES
Occupational Therapy  Facility/Department: [facility] SURG SERVICES  Occupational Therapy Initial Assessment    Name: Ivette Chu  : 1955  MRN: [id number]  Date of Service: 2024    Discharge Recommendations:             Patient Diagnosis(es): The primary encounter diagnosis was Acute ischemic right MCA stroke (HCC). Diagnoses of Carotid occlusion, bilateral, Hx of CABG, and Acute respiratory failure with hypoxia (HCC) were also pertinent to this visit.  Past Medical History:  has a past medical history of Chronic pain, Expressive aphasia, Hip pain, Low back pain, Muscle spasm, Palliative care patient, Radiculopathy, lumbar region, Shoulder pain, and Stroke (cerebrum) (HCC).  Past Surgical History:  has no past surgical history on file.    Treatment Diagnosis: R CVA, respiratory failure      Assessment   Performance deficits / Impairments: Decreased functional mobility ;Decreased ADL status;Decreased endurance;Decreased cognition;Decreased ROM;Decreased safe awareness;Decreased strength;Decreased balance;Decreased fine motor control;Decreased coordination;Decreased vision/visual deficit  Assessment: Will progress as tolerated  Treatment Diagnosis: R CVA, respiratory failure  Prognosis: Fair  Decision Making: Medium Complexity  REQUIRES OT FOLLOW-UP: Yes  Activity Tolerance  Activity Tolerance: Treatment limited secondary to medical complications (free text)        Plan   Occupational Therapy Plan  Times Per Week: 3-5x/week  Times Per Day: Once a day     Restrictions  Restrictions/Precautions  Restrictions/Precautions: Fall Risk    Subjective   General  Chart Reviewed: Yes  Family / Caregiver Present: Yes (Daughter)  Diagnosis: R CVA, respiratory failure  General Comment  Comments: Daughter reports pt. was independent with self care 1 week ago.  Pt. presents with L side neglect and head turned to R side. B knees flexed at 120 deg.     Social/Functional History  Social/Functional History  ADL Assistance:  Demonstrate 2-/5 MMS in L shld or L elbow  Short Term Goal 3: Mod A of 2 sit-stand at bedside  Short Term Goal 4: Pt. to attend to L side activities with no cues  Long Term Goals  Long Term Goal 1: Return to PLOF       Therapy Time   Individual Concurrent Group Co-treatment   Time In           Time Out           Minutes                   Rahul Hidalgo, OT

## 2024-01-23 ENCOUNTER — APPOINTMENT (OUTPATIENT)
Dept: MRI IMAGING | Age: 69
End: 2024-01-23
Payer: MEDICARE

## 2024-01-23 ENCOUNTER — APPOINTMENT (OUTPATIENT)
Dept: GENERAL RADIOLOGY | Age: 69
End: 2024-01-23
Payer: MEDICARE

## 2024-01-23 DIAGNOSIS — I71.43 INFRARENAL ABDOMINAL AORTIC ANEURYSM (AAA) WITHOUT RUPTURE (HCC): Primary | ICD-10-CM

## 2024-01-23 LAB
ANION GAP SERPL CALCULATED.3IONS-SCNC: 10 MMOL/L (ref 7–19)
BUN SERPL-MCNC: 11 MG/DL (ref 8–23)
CALCIUM SERPL-MCNC: 8.8 MG/DL (ref 8.8–10.2)
CHLORIDE SERPL-SCNC: 112 MMOL/L (ref 98–111)
CO2 SERPL-SCNC: 25 MMOL/L (ref 22–29)
CREAT SERPL-MCNC: 0.4 MG/DL (ref 0.5–0.9)
ERYTHROCYTE [DISTWIDTH] IN BLOOD BY AUTOMATED COUNT: 15.9 % (ref 11.5–14.5)
GLUCOSE BLD-MCNC: 114 MG/DL (ref 70–99)
GLUCOSE BLD-MCNC: 122 MG/DL (ref 70–99)
GLUCOSE BLD-MCNC: 89 MG/DL (ref 70–99)
GLUCOSE BLD-MCNC: 95 MG/DL (ref 70–99)
GLUCOSE SERPL-MCNC: 107 MG/DL (ref 74–109)
HCT VFR BLD AUTO: 37.6 % (ref 37–47)
HGB BLD-MCNC: 11.4 G/DL (ref 12–16)
MCH RBC QN AUTO: 27.5 PG (ref 27–31)
MCHC RBC AUTO-ENTMCNC: 30.3 G/DL (ref 33–37)
MCV RBC AUTO: 90.6 FL (ref 81–99)
PERFORMED ON: ABNORMAL
PERFORMED ON: ABNORMAL
PERFORMED ON: NORMAL
PERFORMED ON: NORMAL
PLATELET # BLD AUTO: 169 K/UL (ref 130–400)
PMV BLD AUTO: 10 FL (ref 9.4–12.3)
POTASSIUM SERPL-SCNC: 3.7 MMOL/L (ref 3.5–5)
RBC # BLD AUTO: 4.15 M/UL (ref 4.2–5.4)
SODIUM SERPL-SCNC: 147 MMOL/L (ref 136–145)
WBC # BLD AUTO: 8.7 K/UL (ref 4.8–10.8)

## 2024-01-23 PROCEDURE — 6360000002 HC RX W HCPCS: Performed by: STUDENT IN AN ORGANIZED HEALTH CARE EDUCATION/TRAINING PROGRAM

## 2024-01-23 PROCEDURE — 94640 AIRWAY INHALATION TREATMENT: CPT

## 2024-01-23 PROCEDURE — 94760 N-INVAS EAR/PLS OXIMETRY 1: CPT

## 2024-01-23 PROCEDURE — 99232 SBSQ HOSP IP/OBS MODERATE 35: CPT

## 2024-01-23 PROCEDURE — 70551 MRI BRAIN STEM W/O DYE: CPT

## 2024-01-23 PROCEDURE — 82962 GLUCOSE BLOOD TEST: CPT

## 2024-01-23 PROCEDURE — 6370000000 HC RX 637 (ALT 250 FOR IP): Performed by: STUDENT IN AN ORGANIZED HEALTH CARE EDUCATION/TRAINING PROGRAM

## 2024-01-23 PROCEDURE — 2700000000 HC OXYGEN THERAPY PER DAY

## 2024-01-23 PROCEDURE — 97110 THERAPEUTIC EXERCISES: CPT

## 2024-01-23 PROCEDURE — 2500000003 HC RX 250 WO HCPCS: Performed by: STUDENT IN AN ORGANIZED HEALTH CARE EDUCATION/TRAINING PROGRAM

## 2024-01-23 PROCEDURE — 36415 COLL VENOUS BLD VENIPUNCTURE: CPT

## 2024-01-23 PROCEDURE — 51702 INSERT TEMP BLADDER CATH: CPT

## 2024-01-23 PROCEDURE — 1210000000 HC MED SURG R&B

## 2024-01-23 PROCEDURE — 2580000003 HC RX 258: Performed by: STUDENT IN AN ORGANIZED HEALTH CARE EDUCATION/TRAINING PROGRAM

## 2024-01-23 PROCEDURE — 80048 BASIC METABOLIC PNL TOTAL CA: CPT

## 2024-01-23 PROCEDURE — 94761 N-INVAS EAR/PLS OXIMETRY MLT: CPT

## 2024-01-23 PROCEDURE — 2580000003 HC RX 258: Performed by: NURSE PRACTITIONER

## 2024-01-23 PROCEDURE — 6370000000 HC RX 637 (ALT 250 FOR IP)

## 2024-01-23 PROCEDURE — 99233 SBSQ HOSP IP/OBS HIGH 50: CPT | Performed by: PSYCHIATRY & NEUROLOGY

## 2024-01-23 PROCEDURE — 71045 X-RAY EXAM CHEST 1 VIEW: CPT

## 2024-01-23 PROCEDURE — 85027 COMPLETE CBC AUTOMATED: CPT

## 2024-01-23 PROCEDURE — 6370000000 HC RX 637 (ALT 250 FOR IP): Performed by: NURSE PRACTITIONER

## 2024-01-23 RX ORDER — MECOBALAMIN 5000 MCG
5 TABLET,DISINTEGRATING ORAL NIGHTLY
Status: DISCONTINUED | OUTPATIENT
Start: 2024-01-23 | End: 2024-01-26 | Stop reason: HOSPADM

## 2024-01-23 RX ORDER — LEVOTHYROXINE SODIUM 0.07 MG/1
37.5 TABLET ORAL DAILY
Status: DISCONTINUED | OUTPATIENT
Start: 2024-01-24 | End: 2024-01-26 | Stop reason: HOSPADM

## 2024-01-23 RX ORDER — RISPERIDONE 1 MG/ML
0.5 SOLUTION ORAL EVERY 6 HOURS PRN
Status: DISCONTINUED | OUTPATIENT
Start: 2024-01-23 | End: 2024-01-26 | Stop reason: HOSPADM

## 2024-01-23 RX ORDER — OXYMETAZOLINE HYDROCHLORIDE 0.05 G/100ML
2 SPRAY NASAL ONCE
Status: COMPLETED | OUTPATIENT
Start: 2024-01-23 | End: 2024-01-23

## 2024-01-23 RX ORDER — ATORVASTATIN CALCIUM 80 MG/1
80 TABLET, FILM COATED ORAL NIGHTLY
Status: DISCONTINUED | OUTPATIENT
Start: 2024-01-23 | End: 2024-01-26 | Stop reason: HOSPADM

## 2024-01-23 RX ORDER — ASPIRIN 81 MG/1
81 TABLET, CHEWABLE ORAL DAILY
Status: DISCONTINUED | OUTPATIENT
Start: 2024-01-24 | End: 2024-01-26 | Stop reason: HOSPADM

## 2024-01-23 RX ORDER — LIDOCAINE HYDROCHLORIDE 20 MG/ML
JELLY TOPICAL ONCE
Status: COMPLETED | OUTPATIENT
Start: 2024-01-23 | End: 2024-01-23

## 2024-01-23 RX ORDER — ASPIRIN 300 MG/1
300 SUPPOSITORY RECTAL DAILY
Status: DISCONTINUED | OUTPATIENT
Start: 2024-01-24 | End: 2024-01-26 | Stop reason: HOSPADM

## 2024-01-23 RX ADMIN — ATORVASTATIN CALCIUM 80 MG: 80 TABLET, FILM COATED ORAL at 21:11

## 2024-01-23 RX ADMIN — Medication 5 MG: at 21:11

## 2024-01-23 RX ADMIN — IPRATROPIUM BROMIDE AND ALBUTEROL SULFATE 1 DOSE: 2.5; .5 SOLUTION RESPIRATORY (INHALATION) at 14:58

## 2024-01-23 RX ADMIN — LIDOCAINE HYDROCHLORIDE: 20 JELLY TOPICAL at 13:36

## 2024-01-23 RX ADMIN — SODIUM CHLORIDE, PRESERVATIVE FREE 10 ML: 5 INJECTION INTRAVENOUS at 08:02

## 2024-01-23 RX ADMIN — Medication 0.5 MG: at 21:11

## 2024-01-23 RX ADMIN — IPRATROPIUM BROMIDE AND ALBUTEROL SULFATE 1 DOSE: 2.5; .5 SOLUTION RESPIRATORY (INHALATION) at 07:57

## 2024-01-23 RX ADMIN — PIPERACILLIN AND TAZOBACTAM 3375 MG: 3; .375 INJECTION, POWDER, LYOPHILIZED, FOR SOLUTION INTRAVENOUS at 01:10

## 2024-01-23 RX ADMIN — Medication 2 SPRAY: at 13:29

## 2024-01-23 RX ADMIN — PIPERACILLIN AND TAZOBACTAM 3375 MG: 3; .375 INJECTION, POWDER, LYOPHILIZED, FOR SOLUTION INTRAVENOUS at 10:43

## 2024-01-23 RX ADMIN — IPRATROPIUM BROMIDE AND ALBUTEROL SULFATE 1 DOSE: 2.5; .5 SOLUTION RESPIRATORY (INHALATION) at 10:52

## 2024-01-23 RX ADMIN — LEVOTHYROXINE SODIUM 50 MCG: 20 INJECTION, SOLUTION INTRAVENOUS at 08:01

## 2024-01-23 RX ADMIN — IPRATROPIUM BROMIDE AND ALBUTEROL SULFATE 1 DOSE: 2.5; .5 SOLUTION RESPIRATORY (INHALATION) at 18:40

## 2024-01-23 RX ADMIN — PIPERACILLIN AND TAZOBACTAM 3375 MG: 3; .375 INJECTION, POWDER, LYOPHILIZED, FOR SOLUTION INTRAVENOUS at 18:41

## 2024-01-23 RX ADMIN — ASPIRIN 300 MG: 300 SUPPOSITORY RECTAL at 08:02

## 2024-01-23 RX ADMIN — ENOXAPARIN SODIUM 30 MG: 100 INJECTION SUBCUTANEOUS at 08:02

## 2024-01-23 RX ADMIN — DEXTROSE MONOHYDRATE, SODIUM CHLORIDE, AND POTASSIUM CHLORIDE: 50; 4.5; 1.49 INJECTION, SOLUTION INTRAVENOUS at 18:33

## 2024-01-23 NOTE — PROGRESS NOTES
Palliative Care Progress Note  1/23/2024 8:42 AM    Patient:  Ivette Chu  YOB: 1955  Primary Care Physician: Vin Espino, RN  Advance Directive: DNR  Admit Date: 1/19/2024       Hospital Day: 4  Portions of this note have been copied forward, however, changed to reflect the most current clinical status of this patient.    CHIEF COMPLAINT/REASON FOR CONSULTATION Goals of care, family support, Code status, symptom management     SUBJECTIVE:  Ms. Chu remains somnolent and unable to participate in my visit. Attempting to wean O2. No acute distress during exam.     HPI: The patient is a 68 y.o. female with PMH COPD, PAD, CVA with expressive aphasia and chronic back pain who presented to [facility] ED 1/19/2024 complaining of decreased responsiveness.  She was brought to ED by family with strokelike symptoms on arrival and last known well time around 2300 the night prior to admission.  Symptoms included flaccid left-sided weakness along with left-sided neglect and right eye deviation. CT head showed ischemic changes in the right MCA distribution. CTA of the head and neck revealed an occluded common carotid and internal carotid artery on the right as well as an occluded left internal carotid artery. The case was discussed by the emergency department physician with [facility]. They did not feel she would be an interventional candidate.  She was also found to have acute hypoxemia and tachycardia and was placed on high flow O2 via Ventimask.  CTA chest obtained showed evidence of multiple pulmonary emboli.  Was not placed on therapeutic anticoagulation due to high risk of hemorrhagic transformation of large stroke per neurology.  She was admitted under hospitalist services with neurology following and vascular surgery consulted to evaluate for possibility of IVC filter placement.  Bilateral lower extremity duplex did not show any obvious DVT so no intervention planned for filter placement at this time.   aortic aneurysm (AAA) without rupture (HCC)    Chronic systolic heart failure (HCC)    Palliative care patient  Resolved Problems:    * No resolved hospital problems. *      Visit Summary:  Chart reviewed, patient discussed with nursing staff. Reviewed health issues, work up and treatment plan as well as factors that lead to hospitalization. Ms. Chu is seen at bedside this afternoon with her daughter, Annie, present. Report obtained from RN. Weaning from venti mask to HF NC O2. Has been unable to participate in swallow eval to advance diet. Discussed temporary ALFREDA of nutrition with daughter today. Explained process of dobhoff placement and risks vs benefits. Voiced concern that pt may self remove or require restraints for line safety. Daughter verbalizes understanding and wants to pursue initiation of nutrition at this point if hospitalist wishes to initiate. She remains hopeful that patient will improve to potentially pursue further rehab. She reports pt was more clear at times yesterday and communicating her her but seems more sedated after receiving ativan overnight. I will schedule melatonin at bedtime and add risperdal that can be administered via NG if placed which may be less sedating than the ativan. GOC are unchanged at this time. Will continue as able. I feel pts daughter will lean on recommendations from neuro for prognosis and decisions moving forward. Will continue these discussions with family as able. Palliative care will follow.     Candidate for SCOP: To be determined based on hospital course     Recommendations:      Palliative Care- GOC continue current medical treatment/workup and monitor for improvement.  D/C planning based on hospital course.  Have initiated discussions regarding ALFREDA based on pts progress and comfort measures pending neuro recs. Code status- DNR  Large right MCA stroke- neuro following.  CTA with bilateral ICA occlusion, not a candidate for neuro IR intervention.

## 2024-01-23 NOTE — PLAN OF CARE
Patient has a 1 year follow up at [facility] scheduled with CAROLE Vicente. Patient will be scheduled for repeat CT prior to visit to evaluate abdominal aorta. Appointment is in AVS.

## 2024-01-23 NOTE — PROGRESS NOTES
[facility] Neurology Progress Note      Patient:   Ivette Chu  MR#:    [id number]   Room:    35/535-01   YOB: 1955  Date of Progress Note: 1/23/2024  Time of Note                           9:02 AM  Consulting Physician:  Zeus Corona DO  Attending Physician:  Carlos Zimmerman MD      INTERVAL HISTORY:  Patient is more lethargic this am, was more active with nursing earlier this morning.       REVIEW OF SYSTEMS:  Limited given AMS    PHYSICAL EXAM:    Constitutional -   /82   Pulse (!) 106   Temp 98.2 °F (36.8 °C) (Oral)   Resp 22   Ht 1.549 m (5' 1\")   Wt 40 kg (88 lb 3.2 oz)   SpO2 99%   BMI 16.67 kg/m²   General appearance: No acute distress   EYES -   Conjunctiva normal  Pupillary exam as below, see CN exam in the neurologic exam  ENT-    No scars, masses, or lesions over external nose or ears  Oropharynx without erythema, palate midline  Cardiovascular -   No clubbing, cyanosis, or edema   Pulmonary-   Good expansion, normal effort without use of accessory muscles  Musculoskeletal -   No significant wasting of muscles noted  Gait as below, see gait exam in the neurologic exam  Muscle strength, tone, stability as below see the motor exam in the neurologic exam.   No bony deformities  Skin -   Warm, dry, and intact to inspection and palpation.    No rash, erythema, or pallor        NEUROLOGICAL EXAM     Mental status    [] Awake, alert, oriented   [] Affect attention and concentration appear appropriate  [] Recent and remote memory appears unremarkable  [] Speech normal without dysarthria or aphasia, comprehension and repetition intact.   COMMENTS:Lethargic, speech limited     Cranial Nerves [] No VF deficit to confrontation,  optic discs normal, no papilledema on fundoscopic exam.  [] PERRLA, EOMI, no nystagmus, conjugate eye movements, no ptosis  [] Face symmetric  [] Facial sensation intact  [] Tongue midline no atrophy or fasciculations present  [] Palate midline, hearing  microvascular changes white matter tracts.  Critical result:  Discussed with ordering physician 1743 hours Central time 01/19/2024  All CT scans are performed using dose optimization techniques as appropriate to the performed exam and include at least one of the following: Automated exposure control, adjustment of the mA and/or kV according to size, and the use of iterative reconstruction technique.  ______________________________________ Electronically signed by: TYLER LAN M.D. Date:     01/19/2024 Time:    17:37       Lab Results   Component Value Date    WBC 8.7 01/23/2024    HGB 11.4 (L) 01/23/2024    HCT 37.6 01/23/2024    MCV 90.6 01/23/2024     01/23/2024     Lab Results   Component Value Date     (H) 01/23/2024    K 3.7 01/23/2024     (H) 01/23/2024    CO2 25 01/23/2024    BUN 11 01/23/2024    CREATININE 0.4 (L) 01/23/2024    GLUCOSE 107 01/23/2024    CALCIUM 8.8 01/23/2024    PROT 7.6 01/19/2024    LABALBU 4.3 01/19/2024    BILITOT 0.7 01/19/2024    ALKPHOS 165 (H) 01/19/2024    AST 44 (H) 01/19/2024    ALT 11 01/19/2024    LABGLOM >60 01/23/2024     Lab Results   Component Value Date    INR 1.20 (H) 01/19/2024    PROTIME 14.8 (H) 01/19/2024       RECORD REVIEW:   Previous medical records, medications were reviewed at today's visit.  Nursing/physician notes, imaging, labs and vitals reviewed.   PT,OT and/or speech notes reviewed         ASSESSMENT:  68 y.o. admitted with left sided weakness, aphasia, right gaze preference.  CT with right MCA distribution ischemic changes.  CTA with bilateral ICA occlusion.   Suspect large right hemispheric stroke. PE noted as well, not hemodynamically compromising, vascular surgery following.  Exam with more lethargy, but active with nursing earlier.  ECHO with reduced EF, largely negative from a stroke standpoint.      PLAN:   Follow up MRI, will get done this am    Follow tele    ASA, statin, permissive hypertension, supportive care

## 2024-01-23 NOTE — PROGRESS NOTES
Physical Therapy  Name: Ivette Chu  MRN:  [id number]  Date of service:  1/23/2024 01/23/24 1153   Restrictions/Precautions   Restrictions/Precautions Fall Risk   Subjective   Subjective Pt very lethargic this date. Nurse okayed therapy.   Pain Assessment   Pain Assessment None - Denies Pain   Bed Mobility   Comment worked on initiating rolling by bending up knees, reaching, and turning head   Exercises   Comments PROM of BLE with pt not able to initiate movement on cue but does move BLE on her own (hip/knee flexion/extension, hip abd/add, ankle DF/PF) also UE elbow and shoudler ROM   Other Activities   Comment pt keeps knees and hips flexed most of time; worked on stretching into extension for better positioning but pt does not maintain   Short Term Goals   Time Frame for Short Term Goals 2 WKS   Short Term Goal 1 ROLLS TO THE LEFT WITH MOD A 1   Short Term Goal 2 SUP<>SIT, MOD/MAX A   Short Term Goal 3 SIT EOB X 10 MIN WITH MOD A   Conditions Requiring Skilled Therapeutic Intervention   Body Structures, Functions, Activity Limitations Requiring Skilled Therapeutic Intervention Decreased functional mobility ;Decreased ROM;Decreased strength;Decreased safe awareness;Decreased endurance;Decreased balance;Decreased cognition;Decreased sensation;Decreased fine motor control;Decreased coordination;Decreased vision/visual deficit   Assessment Pt able to tolerate ROM without s/s of pain. Very lethargic and only opens eyes a few times throughout. Was able to participate some in beginning rolling briefly but not able to roll without assist. Pillow placed between knees and positioned for comfort.   Activity Tolerance   Activity Tolerance Patient tolerated treatment well   PT Plan of Care   Tuesday X   Safety Devices   Type of Devices Call light within reach;Bed alarm in place;Left in bed         Electronically signed by Alisha Penny PTA on 1/23/2024 at 12:11 PM

## 2024-01-23 NOTE — PROGRESS NOTES
This  visited with pt's daughter Annie to provide support and spiritual care. Annie says the pt is slowly improving. She also says they are going to do another swallow test. Pt was resting and moving in the bed some. This  provided spiritual care with sustaining presence, support, and prayer. Pt's daughter expressed gratitude for spiritual care.       Electronically signed by Mary Behrens on 1/23/2024 at 11:47 AM

## 2024-01-23 NOTE — PLAN OF CARE
Problem: Nutrition Deficit:  Goal: Optimize nutritional status  1/23/2024 1508 by Roxanne Javier, MS, RD, LD  Outcome: Progressing  Flowsheets (Taken 1/23/2024 1446)  Nutrient intake appropriate for improving, restoring, or maintaining nutritional needs:   Assess nutritional status and recommend course of action   Recommend, monitor, and adjust tube feedings and TPN/PPN based on assessed needs  1/23/2024 0601 by Mak Allen, RN  Outcome: Progressing  1/23/2024 0558 by Mak Allen, RN  Outcome: Progressing

## 2024-01-23 NOTE — PROGRESS NOTES
Daily Progress Note    Date:2024  Patient: Ivette Chu  : 1955  MRN:[id number]  CODE:DNR No additional code details  PCP:Vin Espino, RN    Admit Date: 2024  5:08 PM   LOS: 4 days       Subjective:   Remained on high flow O2 on Ventimask this a.m., later able to transition to O2 via nasal cannula.  Still very somnolent.  Still not moving left side but no new focal deficits.          Summary: 68-year-old female with COPD, PAD, history of stroke with expressive aphasia, presented via EMS with family minimally responsive, last known normal evening prior to presentation.  Noted left-sided neglect and right eye deviation, noted evidence of stroke with infarcts in right frontal and parietal lobes, left encephalomalacia with prior MCA infarct; CTA head/neck showed occluded right common and bilateral ICA occlusions and concern for acute occlusion of a portion of right MCA.  ED discussed with [place], however not deemed candidate for a neurointerventional procedure.  Neurology consulted.  Patient also with acute hypoxemia confirmed on ABG, required high flow O2 via Ventimask along with tachycardia.  CTA chest obtained showed evidence of multiple pulmonary emboli.  Considered therapeutic anticoagulation, however noted high risk of hemorrhagic transformation of her large stroke per neurology.  Vascular surgery consulted to evaluate for possibility of IVC filter placement in lieu of anticoagulation, however lower extremity Dopplers did not show any obvious DVT so no intervention planned.  She was unable to safely pass swallow evaluation over multiple days so NG tube was placed for tube feeds.        Objective:      Vital signs in last 24 hours:  Patient Vitals for the past 24 hrs:   BP Temp Temp src Pulse Resp SpO2   24 1227 (!) 125/52 98.1 °F (36.7 °C) -- 96 20 97 %   24 0809 120/82 -- -- -- -- --   24 0807 -- 97.7 °F (36.5 °C) Temporal 88 20 99 %   24 0758 -- -- -- -- -- 99 %

## 2024-01-23 NOTE — CONSULTS
Comprehensive Nutrition Assessment    Type and Reason for Visit:  Reassess    Nutrition Recommendations/Plan:   Start Jevity 1.2 --at 15ml/hr and advance by5 ml every 6 hours until goal rate of 49ml is reached.       Malnutrition Assessment:  Malnutrition Status:  Severe malnutrition (01/23/24 1500)    Context:  Chronic Illness     Findings of the 6 clinical characteristics of malnutrition:  Energy Intake:  Unable to assess  Weight Loss:  Unable to assess     Body Fat Loss:  Severe body fat loss Orbital, Triceps   Muscle Mass Loss:  Severe muscle mass loss Clavicles (pectoralis & deltoids)  Fluid Accumulation:  No significant fluid accumulation Extremities   Strength:  Not Performed    Nutrition Assessment:    Pt has had an NG placed and will start TF.  Starting Jevity 1.2 goal rate 49ml/hr with 20ml free water flush.    Continues to receive D5% in 1/2NS with KCl at 50ml/hr.  No new weight    Nutrition Related Findings:      Wound Type: None       Current Nutrition Intake & Therapies:    Average Meal Intake: NPO  Average Supplements Intake: NPO  Current Tube Feeding (TF) Orders:  Feeding Route: Nasogastric  Formula: Other Tube Feeding (Comment)  Schedule: Continuous  Feeding Regimen: Jevity 1.2 goal rate 49ml/hr  Additives/Modulars: None  Water Flushes: 20ml  Current TF & Flush Orders Provides: 0  Goal TF & Flush Orders Provides: Jevity 1.2 at 49ml/hr with 20ml free water flush hourly = 1411 kcals with 65g protein, 199g CHO and 949ml free water from formula and 480ml free water from flush    Anthropometric Measures:  Height: 154.9 cm (5' 1\")  Ideal Body Weight (IBW): 105 lbs (48 kg)    Admission Body Weight: 39.9 kg (88 lb)  Current Body Weight: 40 kg (88 lb 2.9 oz),   IBW.    Current BMI (kg/m2): 16.7  BMI Categories: Underweight (BMI less than 22) age over 65    Estimated Daily Nutrient Needs:  Energy Requirements Based On: Kcal/kg  Weight Used for Energy Requirements: Current  Energy (kcal/day): 6536-7324

## 2024-01-23 NOTE — PROGRESS NOTES
Facility/Department: [facility] SURG SERVICES    NAME: Ivette Chu  : 1955  MRN: [id number]    Speech therapy completed initial swallow assessment 24. Have not been able to re-assess swallow function for potential PO intake secondary to continued lethargy. Would recommend continuation NPO status. Recommend short-term alternative means of nutrition. If patient receives mouth care prior to intake, okay for swabs water for comfort. Will continue to follow.    Electronically signed by MOHINDER Jones on 2024 at 1:42 PM

## 2024-01-24 ENCOUNTER — APPOINTMENT (OUTPATIENT)
Dept: GENERAL RADIOLOGY | Age: 69
End: 2024-01-24
Payer: MEDICARE

## 2024-01-24 LAB
ANION GAP SERPL CALCULATED.3IONS-SCNC: 11 MMOL/L (ref 7–19)
BUN SERPL-MCNC: 10 MG/DL (ref 8–23)
CALCIUM SERPL-MCNC: 8.5 MG/DL (ref 8.8–10.2)
CHLORIDE SERPL-SCNC: 113 MMOL/L (ref 98–111)
CO2 SERPL-SCNC: 21 MMOL/L (ref 22–29)
CREAT SERPL-MCNC: 0.5 MG/DL (ref 0.5–0.9)
ERYTHROCYTE [DISTWIDTH] IN BLOOD BY AUTOMATED COUNT: 16.3 % (ref 11.5–14.5)
GLUCOSE BLD-MCNC: 117 MG/DL (ref 70–99)
GLUCOSE BLD-MCNC: 145 MG/DL (ref 70–99)
GLUCOSE BLD-MCNC: 150 MG/DL (ref 70–99)
GLUCOSE SERPL-MCNC: 136 MG/DL (ref 74–109)
HCT VFR BLD AUTO: 40.3 % (ref 37–47)
HGB BLD-MCNC: 12.1 G/DL (ref 12–16)
MCH RBC QN AUTO: 27.9 PG (ref 27–31)
MCHC RBC AUTO-ENTMCNC: 30 G/DL (ref 33–37)
MCV RBC AUTO: 92.9 FL (ref 81–99)
PERFORMED ON: ABNORMAL
PLATELET # BLD AUTO: 170 K/UL (ref 130–400)
PMV BLD AUTO: 10 FL (ref 9.4–12.3)
POTASSIUM SERPL-SCNC: 3.8 MMOL/L (ref 3.5–5)
RBC # BLD AUTO: 4.34 M/UL (ref 4.2–5.4)
SODIUM SERPL-SCNC: 145 MMOL/L (ref 136–145)
WBC # BLD AUTO: 8.7 K/UL (ref 4.8–10.8)

## 2024-01-24 PROCEDURE — 51702 INSERT TEMP BLADDER CATH: CPT

## 2024-01-24 PROCEDURE — 99233 SBSQ HOSP IP/OBS HIGH 50: CPT | Performed by: PSYCHIATRY & NEUROLOGY

## 2024-01-24 PROCEDURE — 85027 COMPLETE CBC AUTOMATED: CPT

## 2024-01-24 PROCEDURE — 71045 X-RAY EXAM CHEST 1 VIEW: CPT

## 2024-01-24 PROCEDURE — 82962 GLUCOSE BLOOD TEST: CPT

## 2024-01-24 PROCEDURE — 97110 THERAPEUTIC EXERCISES: CPT

## 2024-01-24 PROCEDURE — 6370000000 HC RX 637 (ALT 250 FOR IP): Performed by: STUDENT IN AN ORGANIZED HEALTH CARE EDUCATION/TRAINING PROGRAM

## 2024-01-24 PROCEDURE — 2580000003 HC RX 258: Performed by: STUDENT IN AN ORGANIZED HEALTH CARE EDUCATION/TRAINING PROGRAM

## 2024-01-24 PROCEDURE — 80048 BASIC METABOLIC PNL TOTAL CA: CPT

## 2024-01-24 PROCEDURE — 6360000002 HC RX W HCPCS: Performed by: STUDENT IN AN ORGANIZED HEALTH CARE EDUCATION/TRAINING PROGRAM

## 2024-01-24 PROCEDURE — 99232 SBSQ HOSP IP/OBS MODERATE 35: CPT

## 2024-01-24 PROCEDURE — 92526 ORAL FUNCTION THERAPY: CPT

## 2024-01-24 PROCEDURE — 94761 N-INVAS EAR/PLS OXIMETRY MLT: CPT

## 2024-01-24 PROCEDURE — 97530 THERAPEUTIC ACTIVITIES: CPT

## 2024-01-24 PROCEDURE — 94640 AIRWAY INHALATION TREATMENT: CPT

## 2024-01-24 PROCEDURE — 1210000000 HC MED SURG R&B

## 2024-01-24 PROCEDURE — 2700000000 HC OXYGEN THERAPY PER DAY

## 2024-01-24 PROCEDURE — 36415 COLL VENOUS BLD VENIPUNCTURE: CPT

## 2024-01-24 RX ORDER — CARVEDILOL 3.12 MG/1
3.12 TABLET ORAL 2 TIMES DAILY
Status: DISCONTINUED | OUTPATIENT
Start: 2024-01-24 | End: 2024-01-26

## 2024-01-24 RX ORDER — VALSARTAN 40 MG/1
20 TABLET ORAL DAILY
Status: DISCONTINUED | OUTPATIENT
Start: 2024-01-26 | End: 2024-01-26

## 2024-01-24 RX ADMIN — PIPERACILLIN AND TAZOBACTAM 3375 MG: 3; .375 INJECTION, POWDER, LYOPHILIZED, FOR SOLUTION INTRAVENOUS at 03:39

## 2024-01-24 RX ADMIN — IPRATROPIUM BROMIDE AND ALBUTEROL SULFATE 1 DOSE: 2.5; .5 SOLUTION RESPIRATORY (INHALATION) at 07:19

## 2024-01-24 RX ADMIN — LEVOTHYROXINE SODIUM 37.5 MCG: 75 TABLET ORAL at 05:12

## 2024-01-24 RX ADMIN — IPRATROPIUM BROMIDE AND ALBUTEROL SULFATE 1 DOSE: 2.5; .5 SOLUTION RESPIRATORY (INHALATION) at 19:22

## 2024-01-24 RX ADMIN — ENOXAPARIN SODIUM 30 MG: 100 INJECTION SUBCUTANEOUS at 08:22

## 2024-01-24 RX ADMIN — IPRATROPIUM BROMIDE AND ALBUTEROL SULFATE 1 DOSE: 2.5; .5 SOLUTION RESPIRATORY (INHALATION) at 11:24

## 2024-01-24 RX ADMIN — PIPERACILLIN AND TAZOBACTAM 3375 MG: 3; .375 INJECTION, POWDER, LYOPHILIZED, FOR SOLUTION INTRAVENOUS at 18:47

## 2024-01-24 RX ADMIN — IPRATROPIUM BROMIDE AND ALBUTEROL SULFATE 1 DOSE: 2.5; .5 SOLUTION RESPIRATORY (INHALATION) at 15:28

## 2024-01-24 RX ADMIN — PIPERACILLIN AND TAZOBACTAM 3375 MG: 3; .375 INJECTION, POWDER, LYOPHILIZED, FOR SOLUTION INTRAVENOUS at 10:56

## 2024-01-24 RX ADMIN — ASPIRIN 300 MG: 300 SUPPOSITORY RECTAL at 08:22

## 2024-01-24 NOTE — PROGRESS NOTES
Daily Progress Note    Date:2024  Patient: Ivette Chu  : 1955  MRN:[id number]  CODE:DNR No additional code details  PCP:Vin Espino, RN    Admit Date: 2024  5:08 PM   LOS: 5 days       Subjective:   NG tube in place for feeds.  Requiring soft restraints for safety due to agitation.  She remains somnolent but arousable, remains minimally verbal, still not moving left side.  Currently down to 5 L nasal cannula supplemental O2.          Summary: 68-year-old female with COPD, PAD, history of stroke with expressive aphasia, presented via EMS with family minimally responsive, last known normal evening prior to presentation.  Noted left-sided neglect and right eye deviation, noted evidence of stroke with infarcts in right frontal and parietal lobes, left encephalomalacia with prior MCA infarct; CTA head/neck showed occluded right common and bilateral ICA occlusions and concern for acute occlusion of a portion of right MCA.  ED discussed with [place], however not deemed candidate for a neurointerventional procedure.  Neurology consulted.  Patient also with acute hypoxemia confirmed on ABG, required high flow O2 via Ventimask along with tachycardia.  CTA chest obtained showed evidence of multiple pulmonary emboli.  Considered therapeutic anticoagulation, however noted high risk of hemorrhagic transformation of her large stroke per neurology.  Vascular surgery consulted to evaluate for possibility of IVC filter placement in lieu of anticoagulation, however lower extremity Dopplers did not show any obvious DVT so no intervention planned.  She was unable to safely pass swallow evaluation over multiple days so NG tube was placed for tube feeds.        Objective:      Vital signs in last 24 hours:  Patient Vitals for the past 24 hrs:   BP Temp Temp src Pulse Resp SpO2   24 1201 105/63 97.3 °F (36.3 °C) Temporal (!) 102 14 92 %   24 1125 -- -- -- -- -- 90 %   24 0745 (!) 123/57 97.3 °F (36.3  with bilateral ICA occlusion  -- Neurology following  --MRI brain reviewed noting infarcts in right frontal and parietal lobes, also with significant chronic changes with encephalomalacia  --Echo reviewed, systolic dysfunction with EF 40%, moderate AS, otherwise negative from stroke standpoint  -- Aspirin, statin    pulmonary emboli involving left upper and left lower lobe segments without cor pulmonale  - No evidence of right heart strain on echo  -- Holding off anticoagulation due to risk of hemorrhagic transformation with the extent of her stroke  --Lower extremity Dopplers did not show any obvious DVT  --Vascular surgery evaluated, but no intervention needed    Likely aspiration pneumonia, differential also includes pneumonitis  Bilateral pulmonary infiltrates  -- Continue treatment with IV Zosyn, leukocytosis now resolved, will tentatively plan to complete 7-day antibiotic course    COPD with atelectasis of right middle lobe as well as mucous plugging on initial presentation  --Mucous plugging with interval improvement given improved aeration in central right lung on repeat chest x-ray  --Continue DuoNebs  -- Continue pulmonary toileting as able    Acute hypoxemic respiratory failure  - Slowly improving, O2 weaned down to 5 L NC, continue supplemental oxygen as needed to maintain adequate oxygenation with goal SpO2 88-92%      Nontraumatic rhabdomyolysis  - Improving  - Renal function normal, no significant electrolyte derangement, monitoring metabolic panel    systolic heart failure, likely chronic  - Echo reviewed, EF 40%  -- Guideline directed heart failure therapy, add low-dose Coreg and will consider adding ARB or ACE inhibitor pending clinical course    Severe hypothyroidism  - Markedly elevated TSH, low free T4 noted  - Continue levothyroxine    Dysphagia  Malnutrition  - Failed repeat speech evaluation, speech following and reevaluate as appropriate  - NG tube in place for tube feeds, dietitian

## 2024-01-24 NOTE — PROGRESS NOTES
Facility/Department: [facility] SURG SERVICES   SWALLOW THERAPY     NAME: Ivette Chu  : 1955  MRN: [id number]    ADMISSION DATE: 2024  ADMITTING DIAGNOSIS: has Stroke (cerebrum) ([facility]); Expressive aphasia; Cerebrovascular accident (CVA) determined by clinical assessment ([facility]); Elevated troponin; Non-traumatic rhabdomyolysis; Bilateral carotid artery occlusion; Severe malnutrition (HCC); Acute hypoxemic respiratory failure ([facility]); Severe hypothyroidism; Bronchitis; Acute pulmonary embolism without acute cor pulmonale (HCC); Aspiration pneumonia of right lower lobe ([facility]); Infrarenal abdominal aortic aneurysm (AAA) without rupture ([facility]); Chronic systolic heart failure ([facility]); and Palliative care patient on their problem list.    Date of Treat: 2024  Evaluating Therapist: MOHINDER Jones    Current Diet level:  NPO    Pain:  Pain Assessment: None - Denies Pain    Reason for Referral  Ivette Chu was referred for a bedside swallow evaluation to assess the efficiency of her swallow function, identify signs and symptoms of aspiration and make recommendations regarding safe dietary consistencies, effective compensatory strategies, and safe eating environment.    Impression  Re-assessed patient's swallowing function. Patient exhibited inconsistent absent swallows. At times, patient exhibited sluggish, moderately decreased laryngeal elevation for swallow airway protection. Other times, just laryngeal rocking was noted. Patient exhibited degree of airway detection with delayed coughs observed following probable penetration/aspiration of PO trials (ice chips presented by SLP).     At this time, would continue NPO status; alternative means of nutrition. If patient receives mouth care prior to intake, okay for ice chips and swabs water for comfort. Will continue to follow.     Treatment Plan  Requires SLP Intervention: Yes     Recommended Diet and Intervention  Diet Solids Recommendation: NPO  Liquid Consistency

## 2024-01-24 NOTE — PROGRESS NOTES
Occupational Therapy     01/24/24 1127   Subjective   Subjective Nursing okay'ed treatment. Pt seen at bed level.   Pain Assessment   Pain Assessment None - Denies Pain   Cognition   Overall Cognitive Status Exceptions   Orientation   Overall Orientation Status X   Orientation Level Unable to assess   Bed Mobility Training   Bed Mobility Training Yes   Overall Level of Assistance Maximum assistance   Interventions Verbal cues;Tactile cues;Manual cues   Supine to Sit Maximum assistance   Sit to Supine Maximum assistance   Scooting Maximum assistance;Assist X2   Transfer Training   Transfer Training No   ADL   Feeding NPO   Feeding Skilled Clinical Factors NG-tube   Grooming Dependent/Total   UE Bathing Dependent/Total   LE Bathing Dependent/Total   UE Dressing Dependent/Total   LE Dressing Dependent/Total   Toileting Dependent/Total   Functional Mobility Dependent/Total   Assessment   Assessment Tx focused on PROM performed to BUEs/BLEs; patient able to participate in some AAROM with cues. Pt assisted with repositioning for comfort.   Activity Tolerance Treatment limited secondary to medical complications   Discharge Recommendations Patient would benefit from continued therapy after discharge;24 hour supervision or assist   Occupational Therapy Plan   Times Per Week 3-5x/week   Times Per Day Once a day   OT Plan of Care   Wednesday X     Electronically signed by CHANCE Larson on 1/24/2024 at 12:07 PM

## 2024-01-24 NOTE — PROGRESS NOTES
Comprehensive Nutrition Assessment    Type and Reason for Visit:  Reassess    Nutrition Recommendations/Plan:   Continue to work towards goal rate and monitor tolerance of EN     Malnutrition Assessment:  Malnutrition Status:  Severe malnutrition (01/23/24 1500)    Context:  Chronic Illness     Findings of the 6 clinical characteristics of malnutrition:  Body Fat Loss:  Severe body fat loss Orbital, Triceps   Muscle Mass Loss:  Severe muscle mass loss Clavicles (pectoralis & deltoids)  Fluid Accumulation:  No significant fluid accumulation Extremities    Nutrition Assessment:    TF running at 30 mL/hr at time of visit with 20 mL/H2O flushes every hr. Daughter present in room and notified that we will continue to work towards goal rate of 49 mL/hr. Daughter was in agreement and had no further questions. Continue to monitor tolerance of EN closely.    Current Nutrition Intake & Therapies:    Average Meal Intake: NPO  Average Supplements Intake: NPO  Current Tube Feeding (TF) Orders:  Feeding Route: Nasogastric  Formula: Other Tube Feeding (Comment) (Jevity 1.2)  Schedule: Continuous  Feeding Regimen: Jevity 1.2 @ 49 mL/hr  Additives/Modulars: None  Water Flushes: 20 mL/hr = 480 mL/day  Current TF & Flush Orders Provides: 864 kcals, 39 g/protein, 121 g/CHO, and 1061 mL/fluid/day  Goal TF & Flush Orders Provides: 1411 kcals, 65 g/protein, 199 g/CHO, and 1429 mL/fluid/day    Anthropometric Measures:  Height: 154.9 cm (5' 1\")  Ideal Body Weight (IBW): 105 lbs (48 kg)    Admission Body Weight: 39.9 kg (88 lb)  Current Body Weight: 40 kg (88 lb 2.9 oz)  Current BMI (kg/m2): 16.7  BMI Categories: Underweight (BMI less than 22) age over 65    Estimated Daily Nutrient Needs:  Energy Requirements Based On: Kcal/kg  Weight Used for Energy Requirements: Current  Energy (kcal/day): 6766-2851 kcals/day  Weight Used for Protein Requirements: Current  Protein (g/day): 60-80 g/protein/day  Method Used for Fluid Requirements: 1

## 2024-01-24 NOTE — PROGRESS NOTES
Palliative Care Progress Note  1/24/2024 8:57 AM    Patient:  Ivette Chu  YOB: 1955  Primary Care Physician: Vin Espino, RN  Advance Directive: DNR  Admit Date: 1/19/2024       Hospital Day: 5  Portions of this note have been copied forward, however, changed to reflect the most current clinical status of this patient.    CHIEF COMPLAINT/REASON FOR CONSULTATION Goals of care, family support, Code status, symptom management     SUBJECTIVE:  Ms. Chu continues to be lethargic and confused.  Unable to participate in my visit.  Tube feeding has been initiated.  No acute distress during exam.    HPI: The patient is a 68 y.o. female with PMH COPD, PAD, CVA with expressive aphasia and chronic back pain who presented to [facility] ED 1/19/2024 complaining of decreased responsiveness.  She was brought to ED by family with strokelike symptoms on arrival and last known well time around 2300 the night prior to admission.  Symptoms included flaccid left-sided weakness along with left-sided neglect and right eye deviation. CT head showed ischemic changes in the right MCA distribution. CTA of the head and neck revealed an occluded common carotid and internal carotid artery on the right as well as an occluded left internal carotid artery. The case was discussed by the emergency department physician with [place]. They did not feel she would be an interventional candidate.  She was also found to have acute hypoxemia and tachycardia and was placed on high flow O2 via Ventimask.  CTA chest obtained showed evidence of multiple pulmonary emboli.  Was not placed on therapeutic anticoagulation due to high risk of hemorrhagic transformation of large stroke per neurology.  She was admitted under hospitalist services with neurology following and vascular surgery consulted to evaluate for possibility of IVC filter placement.  Bilateral lower extremity duplex did not show any obvious DVT so no intervention planned for filter  continue current medical treatment/workup and monitor for improvement.  D/C planning based on hospital course.  Have initiated discussions regarding ALFREDA based on pts progress and comfort measures pending neuro recs. Code status- DNR  Large right MCA stroke- neuro following.  CTA with bilateral ICA occlusion, not a candidate for neuro IR intervention.  Follow-up brain MRI today.  Allowing permissive hypertension. Echo 1/19/24 EF 40%, moderate AS, no obvious right heart strain.  PT/OT/SLP as able. Has been unable to participate in swallow eval. Dobhoff placed and tube feeding initiated 1/24/24.  PE KAUSHIK and L L-consider therapeutic dose anticoagulation when no longer risk from stroke standpoint.  Lower extremity Dopplers did not reveal obvious DVT, vascular surgery evaluated and no intervention needed at this time.  Acute hypoxic respiratory failure- mgmt per hospitalist.  Supportive care wean O2 as able.  COPD with CAP- concern for aspiration.  Empiric IV antibiotics continued.  DuoNebs scheduled.  Aggressive pulmonary toilet as able  HFrEF- echo as above.  Consider initiation of heart failure therapies once stable and no longer needing permissive hypertension. Further mgmt per attending  Hypothyroidism- IV levothyroxine continued and transition to oral once diet advanced    Thank you for consulting Palliative Care and allowing us to participate in the care of this patient.     Total Time Spent with patient assessment, interview of independent historian/HCS, workup/treatment review, discussion with medical team, review of current and home medications, education on ALFREDA, medication initiation and monitoring for symptom management, and placement of orders/preparation of this note: 37 minutes                               Electronically signed by CAROLE Soto CNP on 1/24/2024 at 8:57 AM    (Please note that portions of this note were completed with a voice recognition program.  Effortswere made to edit the

## 2024-01-24 NOTE — PROGRESS NOTES
Physical Therapy  Name: Ivette Chu  MRN:  [id number]  Date of service:  1/24/2024 01/24/24 1127   Restrictions/Precautions   Restrictions/Precautions Fall Risk   Subjective   Subjective Pt has eyes open and does attempt to verbalize some.   Pain Assessment   Pain Assessment None - Denies Pain   Bed Mobility   Scooting Maximal assistance;2 Person assistance   Exercises   Hip Extension/Leg Presses on RLE pt was able to press foot against therapist hand with very minimal resistance 3x   Comments BLE PROM with focus on hip and knee extension   Short Term Goals   Time Frame for Short Term Goals 2 WKS   Short Term Goal 1 ROLLS TO THE LEFT WITH MOD A 1   Short Term Goal 2 SUP<>SIT, MOD/MAX A   Short Term Goal 3 SIT EOB X 10 MIN WITH MOD A   Conditions Requiring Skilled Therapeutic Intervention   Body Structures, Functions, Activity Limitations Requiring Skilled Therapeutic Intervention Decreased functional mobility ;Decreased ROM;Decreased strength;Decreased safe awareness;Decreased endurance;Decreased balance;Decreased cognition;Decreased sensation;Decreased fine motor control;Decreased coordination;Decreased vision/visual deficit   Assessment Pt not very responsive verbally but does keep eyes open more. Was able to respond to some cues on RLE. Worked on LE ROM with focus on extension due to pt remaining in very flexed position. Positioned with pillows to elevate UE's and LE's positioned in extension.   Activity Tolerance   Activity Tolerance Patient tolerated treatment well   PT Plan of Care   Wednesday X   Safety Devices   Type of Devices Call light within reach;Bed alarm in place;Left in bed  (restraints clicked back in and nurse present with pt)         Electronically signed by Alisha Penny PTA on 1/24/2024 at 11:38 AM

## 2024-01-24 NOTE — PROGRESS NOTES
[facility] Neurology Progress Note      Patient:   Ivette Chu  MR#:    [id number]   Room:    35/535-01   YOB: 1955  Date of Progress Note: 1/24/2024  Time of Note                           8:20 AM  Consulting Physician:  Zeus Corona DO  Attending Physician:  Carlos Zimmerman MD      INTERVAL HISTORY:  Patient is more alert this am, speech limited, weakness has not improved.     REVIEW OF SYSTEMS:  Limited given AMS    PHYSICAL EXAM:    Constitutional -   BP (!) 123/57   Pulse 94   Temp 97.3 °F (36.3 °C) (Temporal)   Resp 14   Ht 1.549 m (5' 1\")   Wt 40 kg (88 lb 3.2 oz)   SpO2 94%   BMI 16.67 kg/m²   General appearance: No acute distress   EYES -   Conjunctiva normal  Pupillary exam as below, see CN exam in the neurologic exam  ENT-    No scars, masses, or lesions over external nose or ears  Oropharynx without erythema, palate midline  Cardiovascular -   No clubbing, cyanosis, or edema   Pulmonary-   Good expansion, normal effort without use of accessory muscles  Musculoskeletal -   No significant wasting of muscles noted  Gait as below, see gait exam in the neurologic exam  Muscle strength, tone, stability as below see the motor exam in the neurologic exam.   No bony deformities  Skin -   Warm, dry, and intact to inspection and palpation.    No rash, erythema, or pallor        NEUROLOGICAL EXAM     Mental status    [] Awake, alert, oriented   [] Affect attention and concentration appear appropriate  [] Recent and remote memory appears unremarkable  [] Speech normal without dysarthria or aphasia, comprehension and repetition intact.   COMMENTS:Lethargic, speech limited     Cranial Nerves [] No VF deficit to confrontation,  optic discs normal, no papilledema on fundoscopic exam.  [] PERRLA, EOMI, no nystagmus, conjugate eye movements, no ptosis  [] Face symmetric  [] Facial sensation intact  [] Tongue midline no atrophy or fasciculations present  [] Palate midline, hearing to finger

## 2024-01-25 PROBLEM — J96.01 ACUTE RESPIRATORY FAILURE WITH HYPOXIA (HCC): Status: ACTIVE | Noted: 2024-01-25

## 2024-01-25 LAB
ANION GAP SERPL CALCULATED.3IONS-SCNC: 8 MMOL/L (ref 7–19)
BUN SERPL-MCNC: 12 MG/DL (ref 8–23)
CALCIUM SERPL-MCNC: 8 MG/DL (ref 8.8–10.2)
CHLORIDE SERPL-SCNC: 109 MMOL/L (ref 98–111)
CO2 SERPL-SCNC: 22 MMOL/L (ref 22–29)
CREAT SERPL-MCNC: 0.5 MG/DL (ref 0.5–0.9)
ERYTHROCYTE [DISTWIDTH] IN BLOOD BY AUTOMATED COUNT: 16 % (ref 11.5–14.5)
GLUCOSE BLD-MCNC: 75 MG/DL (ref 70–99)
GLUCOSE BLD-MCNC: 77 MG/DL (ref 70–99)
GLUCOSE BLD-MCNC: 81 MG/DL (ref 70–99)
GLUCOSE BLD-MCNC: 92 MG/DL (ref 70–99)
GLUCOSE SERPL-MCNC: 118 MG/DL (ref 74–109)
HCT VFR BLD AUTO: 35.1 % (ref 37–47)
HGB BLD-MCNC: 10.7 G/DL (ref 12–16)
MCH RBC QN AUTO: 27 PG (ref 27–31)
MCHC RBC AUTO-ENTMCNC: 30.5 G/DL (ref 33–37)
MCV RBC AUTO: 88.4 FL (ref 81–99)
PERFORMED ON: NORMAL
PLATELET # BLD AUTO: 163 K/UL (ref 130–400)
PMV BLD AUTO: 10.3 FL (ref 9.4–12.3)
POTASSIUM SERPL-SCNC: 4.5 MMOL/L (ref 3.5–5)
RBC # BLD AUTO: 3.97 M/UL (ref 4.2–5.4)
REASON FOR REJECTION: NORMAL
REJECTED TEST: NORMAL
SODIUM SERPL-SCNC: 139 MMOL/L (ref 136–145)
WBC # BLD AUTO: 7.8 K/UL (ref 4.8–10.8)

## 2024-01-25 PROCEDURE — 1210000000 HC MED SURG R&B

## 2024-01-25 PROCEDURE — 94640 AIRWAY INHALATION TREATMENT: CPT

## 2024-01-25 PROCEDURE — 36415 COLL VENOUS BLD VENIPUNCTURE: CPT

## 2024-01-25 PROCEDURE — 2700000000 HC OXYGEN THERAPY PER DAY

## 2024-01-25 PROCEDURE — 6370000000 HC RX 637 (ALT 250 FOR IP)

## 2024-01-25 PROCEDURE — 82962 GLUCOSE BLOOD TEST: CPT

## 2024-01-25 PROCEDURE — 85027 COMPLETE CBC AUTOMATED: CPT

## 2024-01-25 PROCEDURE — 2580000003 HC RX 258: Performed by: STUDENT IN AN ORGANIZED HEALTH CARE EDUCATION/TRAINING PROGRAM

## 2024-01-25 PROCEDURE — 99233 SBSQ HOSP IP/OBS HIGH 50: CPT | Performed by: PSYCHIATRY & NEUROLOGY

## 2024-01-25 PROCEDURE — 6370000000 HC RX 637 (ALT 250 FOR IP): Performed by: STUDENT IN AN ORGANIZED HEALTH CARE EDUCATION/TRAINING PROGRAM

## 2024-01-25 PROCEDURE — 99232 SBSQ HOSP IP/OBS MODERATE 35: CPT

## 2024-01-25 PROCEDURE — 80048 BASIC METABOLIC PNL TOTAL CA: CPT

## 2024-01-25 PROCEDURE — 6360000002 HC RX W HCPCS: Performed by: STUDENT IN AN ORGANIZED HEALTH CARE EDUCATION/TRAINING PROGRAM

## 2024-01-25 PROCEDURE — 51702 INSERT TEMP BLADDER CATH: CPT

## 2024-01-25 RX ORDER — MORPHINE SULFATE 20 MG/ML
5 SOLUTION ORAL
Status: DISCONTINUED | OUTPATIENT
Start: 2024-01-25 | End: 2024-01-26

## 2024-01-25 RX ORDER — MORPHINE SULFATE 2 MG/ML
2 INJECTION, SOLUTION INTRAMUSCULAR; INTRAVENOUS EVERY 4 HOURS PRN
Status: DISCONTINUED | OUTPATIENT
Start: 2024-01-25 | End: 2024-01-26

## 2024-01-25 RX ORDER — LORAZEPAM 2 MG/ML
1 CONCENTRATE ORAL EVERY 6 HOURS PRN
Status: DISCONTINUED | OUTPATIENT
Start: 2024-01-25 | End: 2024-01-26

## 2024-01-25 RX ADMIN — Medication 1 MG: at 21:32

## 2024-01-25 RX ADMIN — CARVEDILOL 3.12 MG: 3.12 TABLET, FILM COATED ORAL at 01:07

## 2024-01-25 RX ADMIN — IPRATROPIUM BROMIDE AND ALBUTEROL SULFATE 1 DOSE: 2.5; .5 SOLUTION RESPIRATORY (INHALATION) at 11:40

## 2024-01-25 RX ADMIN — PIPERACILLIN AND TAZOBACTAM 3375 MG: 3; .375 INJECTION, POWDER, LYOPHILIZED, FOR SOLUTION INTRAVENOUS at 01:06

## 2024-01-25 RX ADMIN — PIPERACILLIN AND TAZOBACTAM 3375 MG: 3; .375 INJECTION, POWDER, LYOPHILIZED, FOR SOLUTION INTRAVENOUS at 10:16

## 2024-01-25 RX ADMIN — Medication 5 MG: at 21:31

## 2024-01-25 RX ADMIN — ENOXAPARIN SODIUM 30 MG: 100 INJECTION SUBCUTANEOUS at 10:17

## 2024-01-25 RX ADMIN — Medication 1 MG: at 15:28

## 2024-01-25 RX ADMIN — IPRATROPIUM BROMIDE AND ALBUTEROL SULFATE 1 DOSE: 2.5; .5 SOLUTION RESPIRATORY (INHALATION) at 15:31

## 2024-01-25 RX ADMIN — IPRATROPIUM BROMIDE AND ALBUTEROL SULFATE 1 DOSE: 2.5; .5 SOLUTION RESPIRATORY (INHALATION) at 07:27

## 2024-01-25 RX ADMIN — Medication 0.5 MG: at 01:07

## 2024-01-25 RX ADMIN — Medication 5 MG: at 15:27

## 2024-01-25 RX ADMIN — ASPIRIN 81 MG: 81 TABLET, CHEWABLE ORAL at 10:17

## 2024-01-25 RX ADMIN — ATORVASTATIN CALCIUM 80 MG: 80 TABLET, FILM COATED ORAL at 01:07

## 2024-01-25 RX ADMIN — Medication 0.5 MG: at 21:31

## 2024-01-25 RX ADMIN — Medication 5 MG: at 01:07

## 2024-01-25 RX ADMIN — LEVOTHYROXINE SODIUM 37.5 MCG: 75 TABLET ORAL at 06:13

## 2024-01-25 ASSESSMENT — PULMONARY FUNCTION TESTS
PEFR_L/MIN: 18
PEFR_L/MIN: 18

## 2024-01-25 NOTE — PROGRESS NOTES
Chronic systolic heart failure (HCC)    Palliative care patient    Acute respiratory failure with hypoxia ([facility])  Resolved Problems:    * No resolved hospital problems. *      Acute stroke in right frontal and parietal lobes  Aphasia, right gaze preference, left sided weakness on presentation  CTA with bilateral ICA occlusion  -- Neurology following  --MRI brain reviewed noting infarcts in right frontal and parietal lobes, also with significant chronic changes with encephalomalacia  --Echo reviewed, systolic dysfunction with EF 40%, moderate AS, otherwise negative from stroke standpoint  -- Aspirin, statin    pulmonary emboli involving left upper and left lower lobe segments without cor pulmonale  - No evidence of right heart strain on echo  -- Holding off anticoagulation due to risk of hemorrhagic transformation with the extent of her stroke  --Lower extremity Dopplers did not show any obvious DVT  --Vascular surgery evaluated, but no intervention needed    Likely aspiration pneumonia, differential also includes pneumonitis  Bilateral pulmonary infiltrates  -- Leukocytosis resolved while on IV Zosyn    COPD with atelectasis of right middle lobe as well as mucous plugging on initial presentation  -- Bronchodilators, pulmonary toileting    Acute hypoxemic respiratory failure  - Continue supplemental O2 to maintain SpO2 88-92%      Nontraumatic rhabdomyolysis  - Improved    systolic heart failure, likely chronic  - Echo reviewed, EF 40%  -- Guideline directed heart failure therapy, initiated therapy with low-dose Coreg and ARB    Severe hypothyroidism  - Markedly elevated TSH, low free T4 noted  - Continue levothyroxine    Dysphagia  Malnutrition  - Failed repeat speech evaluation, speech following and reevaluate as appropriate  - NG tube in place for tube feeds, dietitian following    Hypernatremia-improving  Dehydration  - IV fluid hydration    PT/OT/speech following    Poor prognosis    Palliative care

## 2024-01-25 NOTE — CARE COORDINATION
Patient's daughter has decided to take patient home with hospice.  Electronically signed by Merlin Sahni RN, BSN on 1/25/2024 at 1:25 PM

## 2024-01-25 NOTE — PROGRESS NOTES
Palliative Care Progress Note  1/25/2024 7:44 AM    Patient:  Ivette Chu  YOB: 1955  Primary Care Physician: Vin Espino, RN  Advance Directive: DNR  Admit Date: 1/19/2024       Hospital Day: 6  Portions of this note have been copied forward, however, changed to reflect the most current clinical status of this patient.    CHIEF COMPLAINT/REASON FOR CONSULTATION Goals of care, family support, Code status, symptom management     SUBJECTIVE:  Ms. Chu has had little change in condition.  Remains lethargic and unable to meaningfully participate in visit.  Requiring restraints for line safety and intermittent restlessness.  No acute distress during my exam.    HPI: The patient is a 68 y.o. female with PMH COPD, PAD, CVA with expressive aphasia and chronic back pain who presented to [facility] ED 1/19/2024 complaining of decreased responsiveness.  She was brought to ED by family with strokelike symptoms on arrival and last known well time around 2300 the night prior to admission.  Symptoms included flaccid left-sided weakness along with left-sided neglect and right eye deviation. CT head showed ischemic changes in the right MCA distribution. CTA of the head and neck revealed an occluded common carotid and internal carotid artery on the right as well as an occluded left internal carotid artery. The case was discussed by the emergency department physician with [place]. They did not feel she would be an interventional candidate.  She was also found to have acute hypoxemia and tachycardia and was placed on high flow O2 via Ventimask.  CTA chest obtained showed evidence of multiple pulmonary emboli.  Was not placed on therapeutic anticoagulation due to high risk of hemorrhagic transformation of large stroke per neurology.  She was admitted under hospitalist services with neurology following and vascular surgery consulted to evaluate for possibility of IVC filter placement.  Bilateral lower extremity duplex did

## 2024-01-25 NOTE — PROGRESS NOTES
[facility] Neurology Progress Note      Patient:   Ivette Chu  MR#:    [id number]   Room:    [facility]/535-01   YOB: 1955  Date of Progress Note: 1/25/2024  Time of Note                           8:10 AM  Consulting Physician:  Zeus Corona DO  Attending Physician:  Carlos Zimmerman MD      INTERVAL HISTORY:  Doing about the same, speech limited, weakness has not improved.     REVIEW OF SYSTEMS:  Limited given AMS    PHYSICAL EXAM:    Constitutional -   BP (!) 89/31   Pulse 78   Temp 98.2 °F (36.8 °C)   Resp 18   Ht 1.549 m (5' 1\")   Wt 40 kg (88 lb 3.2 oz)   SpO2 91%   BMI 16.67 kg/m²   General appearance: No acute distress   EYES -   Conjunctiva normal  Pupillary exam as below, see CN exam in the neurologic exam  ENT-    No scars, masses, or lesions over external nose or ears  Oropharynx without erythema, palate midline  Cardiovascular -   No clubbing, cyanosis, or edema   Pulmonary-   Good expansion, normal effort without use of accessory muscles  Musculoskeletal -   No significant wasting of muscles noted  Gait as below, see gait exam in the neurologic exam  Muscle strength, tone, stability as below see the motor exam in the neurologic exam.   No bony deformities  Skin -   Warm, dry, and intact to inspection and palpation.    No rash, erythema, or pallor        NEUROLOGICAL EXAM     Mental status    [] Awake, alert, oriented   [] Affect attention and concentration appear appropriate  [] Recent and remote memory appears unremarkable  [] Speech normal without dysarthria or aphasia, comprehension and repetition intact.   COMMENTS:More alert, speech limited     Cranial Nerves [] No VF deficit to confrontation,  optic discs normal, no papilledema on fundoscopic exam.  [] PERRLA, EOMI, no nystagmus, conjugate eye movements, no ptosis  [] Face symmetric  [] Facial sensation intact  [] Tongue midline no atrophy or fasciculations present  [] Palate midline, hearing to finger rub normal  []

## 2024-01-26 VITALS
TEMPERATURE: 99.1 F | HEIGHT: 61 IN | SYSTOLIC BLOOD PRESSURE: 68 MMHG | DIASTOLIC BLOOD PRESSURE: 50 MMHG | WEIGHT: 88.2 LBS | HEART RATE: 115 BPM | BODY MASS INDEX: 16.65 KG/M2 | RESPIRATION RATE: 18 BRPM | OXYGEN SATURATION: 90 %

## 2024-01-26 LAB
GLUCOSE BLD-MCNC: 101 MG/DL (ref 70–99)
PERFORMED ON: ABNORMAL

## 2024-01-26 PROCEDURE — 6370000000 HC RX 637 (ALT 250 FOR IP): Performed by: STUDENT IN AN ORGANIZED HEALTH CARE EDUCATION/TRAINING PROGRAM

## 2024-01-26 PROCEDURE — 6370000000 HC RX 637 (ALT 250 FOR IP)

## 2024-01-26 PROCEDURE — 6370000000 HC RX 637 (ALT 250 FOR IP): Performed by: HOSPITALIST

## 2024-01-26 PROCEDURE — G0316 PR PROLONG INPT EVAL ADD15 M: HCPCS

## 2024-01-26 PROCEDURE — 94640 AIRWAY INHALATION TREATMENT: CPT

## 2024-01-26 PROCEDURE — 99233 SBSQ HOSP IP/OBS HIGH 50: CPT

## 2024-01-26 PROCEDURE — 82962 GLUCOSE BLOOD TEST: CPT

## 2024-01-26 RX ORDER — LORAZEPAM 2 MG/ML
1 CONCENTRATE ORAL EVERY 4 HOURS PRN
Status: DISCONTINUED | OUTPATIENT
Start: 2024-01-26 | End: 2024-01-26 | Stop reason: HOSPADM

## 2024-01-26 RX ORDER — MORPHINE SULFATE 2 MG/ML
2 INJECTION, SOLUTION INTRAMUSCULAR; INTRAVENOUS
Status: DISCONTINUED | OUTPATIENT
Start: 2024-01-26 | End: 2024-01-26 | Stop reason: HOSPADM

## 2024-01-26 RX ORDER — LORAZEPAM 2 MG/ML
1 INJECTION INTRAMUSCULAR
Status: DISCONTINUED | OUTPATIENT
Start: 2024-01-26 | End: 2024-01-26 | Stop reason: HOSPADM

## 2024-01-26 RX ORDER — ATROPINE SULFATE 10 MG/ML
2 SOLUTION/ DROPS OPHTHALMIC
Status: DISCONTINUED | OUTPATIENT
Start: 2024-01-26 | End: 2024-01-26 | Stop reason: HOSPADM

## 2024-01-26 RX ORDER — GLYCOPYRROLATE 0.2 MG/ML
0.2 INJECTION INTRAMUSCULAR; INTRAVENOUS EVERY 6 HOURS PRN
Status: DISCONTINUED | OUTPATIENT
Start: 2024-01-26 | End: 2024-01-26 | Stop reason: HOSPADM

## 2024-01-26 RX ORDER — SCOLOPAMINE TRANSDERMAL SYSTEM 1 MG/1
1 PATCH, EXTENDED RELEASE TRANSDERMAL
Status: DISCONTINUED | OUTPATIENT
Start: 2024-01-26 | End: 2024-01-26

## 2024-01-26 RX ORDER — SCOLOPAMINE TRANSDERMAL SYSTEM 1 MG/1
2 PATCH, EXTENDED RELEASE TRANSDERMAL
Status: DISCONTINUED | OUTPATIENT
Start: 2024-01-26 | End: 2024-01-26 | Stop reason: HOSPADM

## 2024-01-26 RX ORDER — MORPHINE SULFATE 20 MG/ML
5 SOLUTION ORAL
Status: DISCONTINUED | OUTPATIENT
Start: 2024-01-26 | End: 2024-01-26 | Stop reason: HOSPADM

## 2024-01-26 RX ORDER — ACETAMINOPHEN 650 MG/1
650 SUPPOSITORY RECTAL EVERY 6 HOURS PRN
Status: DISCONTINUED | OUTPATIENT
Start: 2024-01-26 | End: 2024-01-26 | Stop reason: HOSPADM

## 2024-01-26 RX ADMIN — Medication 1 MG: at 05:39

## 2024-01-26 RX ADMIN — Medication 5 MG: at 00:54

## 2024-01-26 RX ADMIN — ATROPINE SULFATE 2 DROP: 10 SOLUTION/ DROPS OPHTHALMIC at 05:48

## 2024-01-26 RX ADMIN — Medication 5 MG: at 05:39

## 2024-01-26 RX ADMIN — IPRATROPIUM BROMIDE AND ALBUTEROL SULFATE 1 DOSE: 2.5; .5 SOLUTION RESPIRATORY (INHALATION) at 07:23

## 2024-01-26 RX ADMIN — Medication 5 MG: at 08:15

## 2024-01-26 NOTE — DISCHARGE SUMMARY
Discharge Summary    NAME: Ivette Chu  :  1955  MRN:  [id number]    Admit date:  2024  Discharge date:  2024    Advance Directive: DNR    Consults: neurology and vascular surgery    Primary Care Physician:  Vin Espino, RN    Discharge Diagnoses:  Principal Problem:    Cerebrovascular accident (CVA) determined by clinical assessment ([facility])  Active Problems:    Acute hypoxemic respiratory failure (HCC)    Expressive aphasia    Elevated troponin    Non-traumatic rhabdomyolysis    Bilateral carotid artery occlusion    Severe malnutrition (HCC)    Severe hypothyroidism    Bronchitis    Acute pulmonary embolism without acute cor pulmonale (HCC)    Aspiration pneumonia of right lower lobe (HCC)    Infrarenal abdominal aortic aneurysm (AAA) without rupture (HCC)    Chronic systolic heart failure ([facility])    Palliative care patient    Acute respiratory failure with hypoxia ([facility])  Resolved Problems:    * No resolved hospital problems. *      Significant Diagnostic Studies:   CTA PULMONARY W CONTRAST    Result Date: 2024  EXAM: CHEST CTA WITH CONTRAST (PULMONARY ARTERY)  HISTORY: Acute hypoxemia, tachycardia  TECHNIQUE: CTA acquisition of the chest from the thoracic inlet to the upper abdomen following IV contrast administration timed to filling of the pulmonary artery.  Sagittal, coronal and oblique MPR reformats, and sagittal MIP reformats were obtained.  CT Dose Reduction Techniques Employed: Yes  COMPARISON: 2024 chest x-ray  FINDINGS: Lines, Tubes, Devices: None.  Pulmonary Embolism: No main pulmonary artery emboli identified.  There is a left upper lobar pulmonary embolus.  Left upper and lower lobe segmental pulmonary artery emboli are identified.  There is no evidence of right heart strain.  Lung Parenchyma and Airways: Opacification of some right lower lobe bronchioles is seen.  There is near complete atelectasis of the right middle lobe. Right lower lobe tree in bud nodularity is present.   communicating artery are patent.  The right middle cerebral artery M1 segment is diseased but patent.  There is probable occlusion of the superior division of the right M2 segment.  The inferior division is thought to remain patent.  The lungs are emphysematous.  No obvious neck masses or lymphadenopathy.       - Ectasia of the ascending aorta to 38 mm. - Occluded right common carotid and internal carotid arteries. - Occluded left internal carotid artery. - Collateral reconstitution of the bilateral anterior and middle cerebral arteries occurs through a patent left posterior communicating artery. - Probable acute occlusion of the superior division of the right M2 division of the right middle cerebral artery.     .  All CT scans are performed using dose optimization techniques as appropriate to the performed exam and include at least one of the following: Automated exposure control, adjustment of the mA and/or kV according to size, and the use of iterative reconstruction technique.  ______________________________________ Electronically signed by: TYLER GALVAN D.O. Date:     01/19/2024 Time:    17:44     CT HEAD WO CONTRAST    Result Date: 1/19/2024  EXAM: CT BRAIN WITHOUT CONTRAST    HISTORY:  Stroke symptoms    TECHNIQUE:  CT of the brain without intravenous contrast.  FINDINGS:  No prior study for comparison.  Large territory encephalomalacia of the left hemisphere.  Hypodensity of the right frontal and right parietal lobes.  Generalized involutional atrophy, moderate - severe.  Chronic microvascular changes white matter tracts, severe..  There is no hydrocephalus or abnormal extra-axial collection.  No obvious hyperdense middle cerebral artery although the left middle cerebral artery is small compared with the right.  The bony cranium appears normal.  The visualized paranasal sinuses are clear. Soft tissues without significant abnormality.      1.  Age indeterminate infarcts of the right frontal and right

## 2024-01-26 NOTE — PROGRESS NOTES
Fitz Santana, Palliative care nurse practitioner, asked this  to provide support for pt and family. NP says pt is declining. Her blood pressure and her breathing are decreasing. NP spoke to pt's daughter and said she didn't think they would be able to get the pt home with Hospice and explained several scenarios. This  provided support and spiritual care.  Pt's daughter Annie says she had not been home, and this  offered to get her some new socks and a couple of clothing items. She said she would appreciate that since she hasn't been able to go home. This  asked if she needed help making some phone calls and she said no but she wanted to go downstairs and then she would come back and call her sister and aunt. This  retrieved the items from the clothes closet and put them in the room. Fitz Santana expressed gratitude for assistance with family.     Electronically signed by Mary Behrens on 1/26/2024 at 8:41 AM

## 2024-01-26 NOTE — PROGRESS NOTES
[facility] Neurology Progress Note      Patient:   Ivette Chu  MR#:    [id number]   Room:    35/535-01   YOB: 1955  Date of Progress Note: 1/26/2024  Time of Note                           8:33 AM  Consulting Physician:  Zeus Corona DO  Attending Physician:  Carlos Zimmerman MD      INTERVAL HISTORY:  No improvement noted, family has opted for hospice.     REVIEW OF SYSTEMS:  Limited given AMS    PHYSICAL EXAM:    Constitutional -   BP (!) 68/50   Pulse (!) 115   Temp 99.1 °F (37.3 °C) (Temporal)   Resp 18   Ht 1.549 m (5' 1\")   Wt 40 kg (88 lb 3.2 oz)   SpO2 90%   BMI 16.67 kg/m²   General appearance: No acute distress   EYES -   Conjunctiva normal  Pupillary exam as below, see CN exam in the neurologic exam  ENT-    No scars, masses, or lesions over external nose or ears  Oropharynx without erythema, palate midline  Cardiovascular -   No clubbing, cyanosis, or edema   Pulmonary-   Good expansion, normal effort without use of accessory muscles  Musculoskeletal -   No significant wasting of muscles noted  Gait as below, see gait exam in the neurologic exam  Muscle strength, tone, stability as below see the motor exam in the neurologic exam.   No bony deformities  Skin -   Warm, dry, and intact to inspection and palpation.    No rash, erythema, or pallor        NEUROLOGICAL EXAM     Mental status    [] Awake, alert, oriented   [] Affect attention and concentration appear appropriate  [] Recent and remote memory appears unremarkable  [] Speech normal without dysarthria or aphasia, comprehension and repetition intact.   COMMENTS:More lethargic, speech limited     Cranial Nerves [] No VF deficit to confrontation,  optic discs normal, no papilledema on fundoscopic exam.  [] PERRLA, EOMI, no nystagmus, conjugate eye movements, no ptosis  [] Face symmetric  [] Facial sensation intact  [] Tongue midline no atrophy or fasciculations present  [] Palate midline, hearing to finger rub normal  []  tracts.  Critical result:  Discussed with ordering physician 1743 hours Central time 01/19/2024  All CT scans are performed using dose optimization techniques as appropriate to the performed exam and include at least one of the following: Automated exposure control, adjustment of the mA and/or kV according to size, and the use of iterative reconstruction technique.  ______________________________________ Electronically signed by: TYLER LAN M.D. Date:     01/19/2024 Time:    17:37       Lab Results   Component Value Date    WBC 7.8 01/25/2024    HGB 10.7 (L) 01/25/2024    HCT 35.1 (L) 01/25/2024    MCV 88.4 01/25/2024     01/25/2024     Lab Results   Component Value Date     01/25/2024    K 4.5 01/25/2024     01/25/2024    CO2 22 01/25/2024    BUN 12 01/25/2024    CREATININE 0.5 01/25/2024    GLUCOSE 118 (H) 01/25/2024    CALCIUM 8.0 (L) 01/25/2024    PROT 7.6 01/19/2024    LABALBU 4.3 01/19/2024    BILITOT 0.7 01/19/2024    ALKPHOS 165 (H) 01/19/2024    AST 44 (H) 01/19/2024    ALT 11 01/19/2024    LABGLOM >60 01/25/2024     Lab Results   Component Value Date    INR 1.20 (H) 01/19/2024    PROTIME 14.8 (H) 01/19/2024       RECORD REVIEW:   Previous medical records, medications were reviewed at today's visit.  Nursing/physician notes, imaging, labs and vitals reviewed.   PT,OT and/or speech notes reviewed         ASSESSMENT:  68 y.o. admitted with left sided weakness, aphasia, right gaze preference.  CTA with bilateral ICA occlusion.   PE noted as well, not hemodynamically compromising, vascular surgery following.  Exam no improvement.  ECHO with reduced EF, largely negative from a stroke standpoint. MRI with large right MCA distribution stroke, small left lacunar stroke, all likely related to carotid disease.  Family has opted for hospice care.      PLAN:   Palliative care following, family has opted for comfort care and hospice.     Will sign off, call if needed.     Please feel free to

## 2024-01-26 NOTE — PROGRESS NOTES
This SW was asked to sign this patient into Hospice services so that she can be admitted to the [facility] as she is nearing the end of life.  This SW came to the patient's room and noted that there were multiple family members at the bedside.  This SW determined that Annie, the patient's daughter, is to sign the paperwork, and she and Ab, the patient's other daughter, accompanied this SW to the family room on the 5th floor.  This SW listened to them as they shared the patient's condition and the events that led to the patient's admission to Hospice.  The necessary evals for Hospice were completed.  Annie and Ab are aware that the patient is nearing the end of life, and they are grieving appropriately.  [facility] and provided Annie, Ab, and Vin, a grandson, with a tour of the [facility].  This SW notified Natalia KIRBY that the patient has been signed in and provided her with the [facility] telephone number to call report.  This SW notified Sri KIRBY [facility] that the patient has been signed in, and this SW plans to continue to follow this patient and her family and assist in the patient's end of life care.

## 2024-01-26 NOTE — PROGRESS NOTES
ipratropium 0.5 mg-albuterol 2.5 mg  1 Dose Inhalation 4x Daily RT    sodium chloride flush  5-40 mL IntraVENous 2 times per day     atropine, LORazepam, morphine 20MG/ML, morphine, risperiDONE, potassium chloride **OR** potassium alternative oral replacement **OR** potassium chloride, LORazepam, sodium chloride flush, sodium chloride, ondansetron **OR** ondansetron, polyethylene glycol, glucose, dextrose bolus **OR** dextrose bolus, glucagon (rDNA), dextrose  Diet NPO Exceptions are: Ice Chips  ADULT TUBE FEEDING; Nasogastric; Other Tube Feeding (specify); Jevity 1.2; Continuous; 15; Yes; 5; Q 6 hours; 49; 20; Q 1 hour     Lab and other Data:     Recent Labs     01/24/24  0251 01/25/24  0557   WBC 8.7 7.8   HGB 12.1 10.7*    163       Recent Labs     01/24/24  0251 01/25/24  0557    139   K 3.8 4.5   * 109   CO2 21* 22   BUN 10 12   CREATININE 0.5 0.5   GLUCOSE 136* 118*       No results for input(s): \"AST\", \"ALT\", \"ALB\", \"BILITOT\", \"ALKPHOS\" in the last 72 hours.    RAD:   CTA PULMONARY W CONTRAST    Result Date: 1/20/2024  EXAM: CHEST CTA WITH CONTRAST (PULMONARY ARTERY)  HISTORY: Acute hypoxemia, tachycardia  TECHNIQUE: CTA acquisition of the chest from the thoracic inlet to the upper abdomen following IV contrast administration timed to filling of the pulmonary artery.  Sagittal, coronal and oblique MPR reformats, and sagittal MIP reformats were obtained.  CT Dose Reduction Techniques Employed: Yes  COMPARISON: 01/19/2024 chest x-ray  FINDINGS: Lines, Tubes, Devices: None.  Pulmonary Embolism: No main pulmonary artery emboli identified.  There is a left upper lobar pulmonary embolus.  Left upper and lower lobe segmental pulmonary artery emboli are identified.  There is no evidence of right heart strain.  Lung Parenchyma and Airways: Opacification of some right lower lobe bronchioles is seen.  There is near complete atelectasis of the right middle lobe. Right lower lobe tree in bud nodularity  anterior communicating artery are patent.  The right middle cerebral artery M1 segment is diseased but patent.  There is probable occlusion of the superior division of the right M2 segment.  The inferior division is thought to remain patent.  The lungs are emphysematous.  No obvious neck masses or lymphadenopathy.       - Ectasia of the ascending aorta to 38 mm. - Occluded right common carotid and internal carotid arteries. - Occluded left internal carotid artery. - Collateral reconstitution of the bilateral anterior and middle cerebral arteries occurs through a patent left posterior communicating artery. - Probable acute occlusion of the superior division of the right M2 division of the right middle cerebral artery.     .  All CT scans are performed using dose optimization techniques as appropriate to the performed exam and include at least one of the following: Automated exposure control, adjustment of the mA and/or kV according to size, and the use of iterative reconstruction technique.  ______________________________________ Electronically signed by: TYLER GALVAN D.O. Date:     01/19/2024 Time:    17:44     CT HEAD WO CONTRAST    Result Date: 1/19/2024  EXAM: CT BRAIN WITHOUT CONTRAST    HISTORY:  Stroke symptoms    TECHNIQUE:  CT of the brain without intravenous contrast.  FINDINGS:  No prior study for comparison.  Large territory encephalomalacia of the left hemisphere.  Hypodensity of the right frontal and right parietal lobes.  Generalized involutional atrophy, moderate - severe.  Chronic microvascular changes white matter tracts, severe..  There is no hydrocephalus or abnormal extra-axial collection.  No obvious hyperdense middle cerebral artery although the left middle cerebral artery is small compared with the right.  The bony cranium appears normal.  The visualized paranasal sinuses are clear. Soft tissues without significant abnormality.      1.  Age indeterminate infarcts of the right frontal
